# Patient Record
Sex: MALE | Race: WHITE | NOT HISPANIC OR LATINO | Employment: OTHER | URBAN - METROPOLITAN AREA
[De-identification: names, ages, dates, MRNs, and addresses within clinical notes are randomized per-mention and may not be internally consistent; named-entity substitution may affect disease eponyms.]

---

## 2018-02-20 ENCOUNTER — OFFICE VISIT CONVERTED (OUTPATIENT)
Dept: SURGERY | Facility: CLINIC | Age: 53
End: 2018-02-20
Attending: UROLOGY

## 2018-03-06 ENCOUNTER — OFFICE VISIT CONVERTED (OUTPATIENT)
Dept: SURGERY | Facility: CLINIC | Age: 53
End: 2018-03-06
Attending: UROLOGY

## 2018-06-15 ENCOUNTER — OFFICE VISIT CONVERTED (OUTPATIENT)
Dept: FAMILY MEDICINE CLINIC | Facility: CLINIC | Age: 53
End: 2018-06-15
Attending: NURSE PRACTITIONER

## 2018-08-13 ENCOUNTER — OFFICE VISIT CONVERTED (OUTPATIENT)
Dept: FAMILY MEDICINE CLINIC | Facility: CLINIC | Age: 53
End: 2018-08-13
Attending: NURSE PRACTITIONER

## 2018-11-28 ENCOUNTER — OFFICE VISIT CONVERTED (OUTPATIENT)
Dept: PULMONOLOGY | Facility: CLINIC | Age: 53
End: 2018-11-28
Attending: INTERNAL MEDICINE

## 2019-02-15 ENCOUNTER — OFFICE VISIT CONVERTED (OUTPATIENT)
Dept: PULMONOLOGY | Facility: CLINIC | Age: 54
End: 2019-02-15
Attending: INTERNAL MEDICINE

## 2019-05-10 ENCOUNTER — OFFICE VISIT CONVERTED (OUTPATIENT)
Dept: PULMONOLOGY | Facility: CLINIC | Age: 54
End: 2019-05-10
Attending: INTERNAL MEDICINE

## 2019-07-19 ENCOUNTER — HOSPITAL ENCOUNTER (OUTPATIENT)
Dept: GENERAL RADIOLOGY | Facility: HOSPITAL | Age: 54
Discharge: HOME | End: 2019-07-19
Attending: INTERNAL MEDICINE

## 2019-10-03 ENCOUNTER — OFFICE VISIT CONVERTED (OUTPATIENT)
Dept: PULMONOLOGY | Facility: CLINIC | Age: 54
End: 2019-10-03
Attending: INTERNAL MEDICINE

## 2019-10-09 ENCOUNTER — HOSPITAL ENCOUNTER (OUTPATIENT)
Dept: CARDIOLOGY | Facility: HOSPITAL | Age: 54
Discharge: HOME | End: 2019-10-09
Attending: INTERNAL MEDICINE

## 2020-04-16 ENCOUNTER — TELEPHONE CONVERTED (OUTPATIENT)
Dept: FAMILY MEDICINE CLINIC | Facility: CLINIC | Age: 55
End: 2020-04-16
Attending: FAMILY MEDICINE

## 2020-04-20 ENCOUNTER — HOSPITAL ENCOUNTER (OUTPATIENT)
Dept: FAMILY MEDICINE CLINIC | Facility: CLINIC | Age: 55
Discharge: HOME | End: 2020-04-20
Attending: FAMILY MEDICINE

## 2020-04-21 LAB
ALBUMIN SERPL-MCNC: 4.4 G/DL (ref 3.5–5)
ALBUMIN/GLOB SERPL: 1.5 {RATIO} (ref 1.4–2.6)
ALP SERPL-CCNC: 97 U/L (ref 56–119)
ALT SERPL-CCNC: 15 U/L (ref 10–40)
ANION GAP SERPL CALC-SCNC: 18 MMOL/L (ref 8–19)
APPEARANCE UR: ABNORMAL
AST SERPL-CCNC: 18 U/L (ref 15–50)
BASOPHILS # BLD AUTO: 0.03 10*3/UL (ref 0–0.2)
BASOPHILS NFR BLD AUTO: 0.2 % (ref 0–3)
BILIRUB SERPL-MCNC: 0.58 MG/DL (ref 0.2–1.3)
BILIRUB UR QL: NEGATIVE
BUN SERPL-MCNC: 12 MG/DL (ref 5–25)
BUN/CREAT SERPL: 13 {RATIO} (ref 6–20)
CALCIUM SERPL-MCNC: 9.3 MG/DL (ref 8.7–10.4)
CHLORIDE SERPL-SCNC: 96 MMOL/L (ref 99–111)
COLOR UR: YELLOW
CONV ABS IMM GRAN: 0.05 10*3/UL (ref 0–0.2)
CONV BACTERIA: NEGATIVE
CONV CO2: 28 MMOL/L (ref 22–32)
CONV COLLECTION SOURCE (UA): ABNORMAL
CONV IMMATURE GRAN: 0.4 % (ref 0–1.8)
CONV TOTAL PROTEIN: 7.3 G/DL (ref 6.3–8.2)
CONV UROBILINOGEN IN URINE BY AUTOMATED TEST STRIP: 1 {EHRLICHU}/DL (ref 0.1–1)
CREAT UR-MCNC: 0.93 MG/DL (ref 0.7–1.2)
DEPRECATED RDW RBC AUTO: 53.7 FL (ref 35.1–43.9)
EOSINOPHIL # BLD AUTO: 0.01 10*3/UL (ref 0–0.7)
EOSINOPHIL # BLD AUTO: 0.1 % (ref 0–7)
ERYTHROCYTE [DISTWIDTH] IN BLOOD BY AUTOMATED COUNT: 14.9 % (ref 11.6–14.4)
GFR SERPLBLD BASED ON 1.73 SQ M-ARVRAT: >60 ML/MIN/{1.73_M2}
GLOBULIN UR ELPH-MCNC: 2.9 G/DL (ref 2–3.5)
GLUCOSE SERPL-MCNC: 146 MG/DL (ref 70–99)
GLUCOSE UR QL: NEGATIVE MG/DL
HCT VFR BLD AUTO: 45.1 % (ref 42–52)
HGB BLD-MCNC: 14.2 G/DL (ref 14–18)
HGB UR QL STRIP: NEGATIVE
KETONES UR QL STRIP: NEGATIVE MG/DL
LEUKOCYTE ESTERASE UR QL STRIP: NEGATIVE
LYMPHOCYTES # BLD AUTO: 0.66 10*3/UL (ref 1–5)
LYMPHOCYTES NFR BLD AUTO: 5.4 % (ref 20–45)
MCH RBC QN AUTO: 30.9 PG (ref 27–31)
MCHC RBC AUTO-ENTMCNC: 31.5 G/DL (ref 33–37)
MCV RBC AUTO: 98 FL (ref 80–96)
MONOCYTES # BLD AUTO: 0.12 10*3/UL (ref 0.2–1.2)
MONOCYTES NFR BLD AUTO: 1 % (ref 3–10)
NEUTROPHILS # BLD AUTO: 11.33 10*3/UL (ref 2–8)
NEUTROPHILS NFR BLD AUTO: 92.9 % (ref 30–85)
NITRITE UR QL STRIP: NEGATIVE
NRBC CBCN: 0 % (ref 0–0.7)
OSMOLALITY SERPL CALC.SUM OF ELEC: 286 MOSM/KG (ref 273–304)
PH UR STRIP.AUTO: >=9 [PH] (ref 5–8)
PLATELET # BLD AUTO: 378 10*3/UL (ref 130–400)
PMV BLD AUTO: 11.6 FL (ref 9.4–12.4)
POTASSIUM SERPL-SCNC: 4.7 MMOL/L (ref 3.5–5.3)
PROT UR QL: NEGATIVE MG/DL
PSA SERPL-MCNC: 0.54 NG/ML (ref 0–4)
RBC # BLD AUTO: 4.6 10*6/UL (ref 4.7–6.1)
RBC #/AREA URNS HPF: ABNORMAL /[HPF]
SODIUM SERPL-SCNC: 137 MMOL/L (ref 135–147)
SP GR UR: 1.02 (ref 1–1.03)
T4 FREE SERPL-MCNC: 1.5 NG/DL (ref 0.9–1.8)
TSH SERPL-ACNC: 0.82 M[IU]/L (ref 0.27–4.2)
WBC # BLD AUTO: 12.2 10*3/UL (ref 4.8–10.8)
WBC #/AREA URNS HPF: ABNORMAL /[HPF]

## 2020-04-23 LAB — BACTERIA UR CULT: NORMAL

## 2020-04-30 ENCOUNTER — HOSPITAL ENCOUNTER (OUTPATIENT)
Dept: FAMILY MEDICINE CLINIC | Facility: CLINIC | Age: 55
Discharge: HOME | End: 2020-04-30
Attending: FAMILY MEDICINE

## 2020-04-30 LAB
BASOPHILS # BLD AUTO: 0.02 10*3/UL (ref 0–0.2)
BASOPHILS NFR BLD AUTO: 0.3 % (ref 0–3)
CONV ABS IMM GRAN: 0.03 10*3/UL (ref 0–0.2)
CONV IMMATURE GRAN: 0.4 % (ref 0–1.8)
DEPRECATED RDW RBC AUTO: 52.7 FL (ref 35.1–43.9)
EOSINOPHIL # BLD AUTO: 0.07 10*3/UL (ref 0–0.7)
EOSINOPHIL # BLD AUTO: 1 % (ref 0–7)
ERYTHROCYTE [DISTWIDTH] IN BLOOD BY AUTOMATED COUNT: 14.7 % (ref 11.6–14.4)
EST. AVERAGE GLUCOSE BLD GHB EST-MCNC: 111 MG/DL
HBA1C MFR BLD: 5.5 % (ref 3.5–5.7)
HCT VFR BLD AUTO: 44.4 % (ref 42–52)
HGB BLD-MCNC: 14.1 G/DL (ref 14–18)
LYMPHOCYTES # BLD AUTO: 1.72 10*3/UL (ref 1–5)
LYMPHOCYTES NFR BLD AUTO: 25.1 % (ref 20–45)
MCH RBC QN AUTO: 30.7 PG (ref 27–31)
MCHC RBC AUTO-ENTMCNC: 31.8 G/DL (ref 33–37)
MCV RBC AUTO: 96.5 FL (ref 80–96)
MONOCYTES # BLD AUTO: 0.38 10*3/UL (ref 0.2–1.2)
MONOCYTES NFR BLD AUTO: 5.5 % (ref 3–10)
NEUTROPHILS # BLD AUTO: 4.63 10*3/UL (ref 2–8)
NEUTROPHILS NFR BLD AUTO: 67.7 % (ref 30–85)
NRBC CBCN: 0 % (ref 0–0.7)
PLATELET # BLD AUTO: 337 10*3/UL (ref 130–400)
PMV BLD AUTO: 9.9 FL (ref 9.4–12.4)
RBC # BLD AUTO: 4.6 10*6/UL (ref 4.7–6.1)
WBC # BLD AUTO: 6.85 10*3/UL (ref 4.8–10.8)

## 2020-07-15 ENCOUNTER — HOSPITAL ENCOUNTER (OUTPATIENT)
Dept: FAMILY MEDICINE CLINIC | Facility: CLINIC | Age: 55
Discharge: HOME | End: 2020-07-15
Attending: FAMILY MEDICINE

## 2020-07-15 LAB
CHOLEST SERPL-MCNC: 193 MG/DL (ref 107–200)
CHOLEST/HDLC SERPL: 4.2 {RATIO} (ref 3–6)
HDLC SERPL-MCNC: 46 MG/DL (ref 40–60)
LDLC SERPL CALC-MCNC: 135 MG/DL (ref 70–100)
TRIGL SERPL-MCNC: 61 MG/DL (ref 40–150)
VLDLC SERPL-MCNC: 12 MG/DL (ref 5–37)

## 2020-11-04 ENCOUNTER — HOSPITAL ENCOUNTER (OUTPATIENT)
Dept: FAMILY MEDICINE CLINIC | Facility: CLINIC | Age: 55
Discharge: HOME | End: 2020-11-04
Attending: FAMILY MEDICINE

## 2020-11-04 ENCOUNTER — HOSPITAL ENCOUNTER (OUTPATIENT)
Dept: OTHER | Facility: HOSPITAL | Age: 55
Discharge: HOME | End: 2020-11-04
Attending: FAMILY MEDICINE

## 2020-11-04 ENCOUNTER — OFFICE VISIT CONVERTED (OUTPATIENT)
Dept: FAMILY MEDICINE CLINIC | Facility: CLINIC | Age: 55
End: 2020-11-04
Attending: FAMILY MEDICINE

## 2020-11-04 LAB
ALBUMIN SERPL-MCNC: 4.5 G/DL (ref 3.5–5)
ALBUMIN/GLOB SERPL: 1.5 {RATIO} (ref 1.4–2.6)
ALP SERPL-CCNC: 99 U/L (ref 56–119)
ALT SERPL-CCNC: 21 U/L (ref 10–40)
ANION GAP SERPL CALC-SCNC: 16 MMOL/L (ref 8–19)
AST SERPL-CCNC: 28 U/L (ref 15–50)
BASOPHILS # BLD AUTO: 0.05 10*3/UL (ref 0–0.2)
BASOPHILS NFR BLD AUTO: 0.7 % (ref 0–3)
BILIRUB SERPL-MCNC: 0.75 MG/DL (ref 0.2–1.3)
BUN SERPL-MCNC: 14 MG/DL (ref 5–25)
BUN/CREAT SERPL: 13 {RATIO} (ref 6–20)
CALCIUM SERPL-MCNC: 9.2 MG/DL (ref 8.7–10.4)
CHLORIDE SERPL-SCNC: 97 MMOL/L (ref 99–111)
CHOLEST SERPL-MCNC: 209 MG/DL (ref 107–200)
CHOLEST/HDLC SERPL: 3.7 {RATIO} (ref 3–6)
CONV ABS IMM GRAN: 0.03 10*3/UL (ref 0–0.2)
CONV CO2: 30 MMOL/L (ref 22–32)
CONV IMMATURE GRAN: 0.4 % (ref 0–1.8)
CONV TOTAL PROTEIN: 7.6 G/DL (ref 6.3–8.2)
CREAT UR-MCNC: 1.08 MG/DL (ref 0.7–1.2)
DEPRECATED RDW RBC AUTO: 46.6 FL (ref 35.1–43.9)
EOSINOPHIL # BLD AUTO: 0.09 10*3/UL (ref 0–0.7)
EOSINOPHIL # BLD AUTO: 1.2 % (ref 0–7)
ERYTHROCYTE [DISTWIDTH] IN BLOOD BY AUTOMATED COUNT: 13.4 % (ref 11.6–14.4)
GFR SERPLBLD BASED ON 1.73 SQ M-ARVRAT: >60 ML/MIN/{1.73_M2}
GLOBULIN UR ELPH-MCNC: 3.1 G/DL (ref 2–3.5)
GLUCOSE SERPL-MCNC: 95 MG/DL (ref 70–99)
HCT VFR BLD AUTO: 45.9 % (ref 42–52)
HDLC SERPL-MCNC: 57 MG/DL (ref 40–60)
HGB BLD-MCNC: 15 G/DL (ref 14–18)
LDLC SERPL CALC-MCNC: 142 MG/DL (ref 70–100)
LYMPHOCYTES # BLD AUTO: 1.87 10*3/UL (ref 1–5)
LYMPHOCYTES NFR BLD AUTO: 24.6 % (ref 20–45)
MCH RBC QN AUTO: 30.9 PG (ref 27–31)
MCHC RBC AUTO-ENTMCNC: 32.7 G/DL (ref 33–37)
MCV RBC AUTO: 94.6 FL (ref 80–96)
MONOCYTES # BLD AUTO: 0.37 10*3/UL (ref 0.2–1.2)
MONOCYTES NFR BLD AUTO: 4.9 % (ref 3–10)
NEUTROPHILS # BLD AUTO: 5.2 10*3/UL (ref 2–8)
NEUTROPHILS NFR BLD AUTO: 68.2 % (ref 30–85)
NRBC CBCN: 0 % (ref 0–0.7)
OSMOLALITY SERPL CALC.SUM OF ELEC: 288 MOSM/KG (ref 273–304)
PLATELET # BLD AUTO: 306 10*3/UL (ref 130–400)
PMV BLD AUTO: 11 FL (ref 9.4–12.4)
POTASSIUM SERPL-SCNC: 4.1 MMOL/L (ref 3.5–5.3)
RBC # BLD AUTO: 4.85 10*6/UL (ref 4.7–6.1)
SODIUM SERPL-SCNC: 139 MMOL/L (ref 135–147)
TRIGL SERPL-MCNC: 50 MG/DL (ref 40–150)
VLDLC SERPL-MCNC: 10 MG/DL (ref 5–37)
WBC # BLD AUTO: 7.61 10*3/UL (ref 4.8–10.8)

## 2020-11-06 LAB — BACTERIA UR CULT: NORMAL

## 2020-11-13 ENCOUNTER — OFFICE VISIT CONVERTED (OUTPATIENT)
Dept: UROLOGY | Facility: CLINIC | Age: 55
End: 2020-11-13
Attending: NURSE PRACTITIONER

## 2020-11-13 ENCOUNTER — CONVERSION ENCOUNTER (OUTPATIENT)
Dept: SURGERY | Facility: CLINIC | Age: 55
End: 2020-11-13

## 2020-12-15 ENCOUNTER — OFFICE VISIT CONVERTED (OUTPATIENT)
Dept: UROLOGY | Facility: CLINIC | Age: 55
End: 2020-12-15
Attending: NURSE PRACTITIONER

## 2020-12-15 ENCOUNTER — CONVERSION ENCOUNTER (OUTPATIENT)
Dept: SURGERY | Facility: CLINIC | Age: 55
End: 2020-12-15

## 2020-12-30 ENCOUNTER — HOSPITAL ENCOUNTER (OUTPATIENT)
Dept: FAMILY MEDICINE CLINIC | Facility: CLINIC | Age: 55
Discharge: HOME | End: 2020-12-30
Attending: FAMILY MEDICINE

## 2020-12-30 ENCOUNTER — OFFICE VISIT CONVERTED (OUTPATIENT)
Dept: FAMILY MEDICINE CLINIC | Facility: CLINIC | Age: 55
End: 2020-12-30
Attending: FAMILY MEDICINE

## 2021-02-11 ENCOUNTER — TELEMEDICINE CONVERTED (OUTPATIENT)
Dept: FAMILY MEDICINE CLINIC | Facility: CLINIC | Age: 56
End: 2021-02-11
Attending: FAMILY MEDICINE

## 2021-03-04 ENCOUNTER — HOSPITAL ENCOUNTER (OUTPATIENT)
Dept: FAMILY MEDICINE CLINIC | Facility: CLINIC | Age: 56
Discharge: HOME | End: 2021-03-04
Attending: FAMILY MEDICINE

## 2021-03-04 ENCOUNTER — HOSPITAL ENCOUNTER (OUTPATIENT)
Dept: CARDIOLOGY | Facility: HOSPITAL | Age: 56
Discharge: HOME | End: 2021-03-04
Attending: FAMILY MEDICINE

## 2021-03-04 ENCOUNTER — OFFICE VISIT CONVERTED (OUTPATIENT)
Dept: FAMILY MEDICINE CLINIC | Facility: CLINIC | Age: 56
End: 2021-03-04
Attending: FAMILY MEDICINE

## 2021-03-04 LAB
ALBUMIN SERPL-MCNC: 4.2 G/DL (ref 3.5–5)
ALBUMIN/GLOB SERPL: 1.4 {RATIO} (ref 1.4–2.6)
ALP SERPL-CCNC: 84 U/L (ref 56–119)
ALT SERPL-CCNC: 16 U/L (ref 10–40)
ANION GAP SERPL CALC-SCNC: 14 MMOL/L (ref 8–19)
AST SERPL-CCNC: 21 U/L (ref 15–50)
BASOPHILS # BLD AUTO: 0.04 10*3/UL (ref 0–0.2)
BASOPHILS NFR BLD AUTO: 0.6 % (ref 0–3)
BILIRUB SERPL-MCNC: 0.9 MG/DL (ref 0.2–1.3)
BNP SERPL-MCNC: 19 PG/ML (ref 0–900)
BUN SERPL-MCNC: 12 MG/DL (ref 5–25)
BUN/CREAT SERPL: 13 {RATIO} (ref 6–20)
CALCIUM SERPL-MCNC: 9.9 MG/DL (ref 8.7–10.4)
CHLORIDE SERPL-SCNC: 96 MMOL/L (ref 99–111)
CONV ABS IMM GRAN: 0.02 10*3/UL (ref 0–0.2)
CONV CO2: 33 MMOL/L (ref 22–32)
CONV IMMATURE GRAN: 0.3 % (ref 0–1.8)
CONV TOTAL PROTEIN: 7.2 G/DL (ref 6.3–8.2)
CREAT UR-MCNC: 0.95 MG/DL (ref 0.7–1.2)
DEPRECATED RDW RBC AUTO: 48.3 FL (ref 35.1–43.9)
EOSINOPHIL # BLD AUTO: 0.1 10*3/UL (ref 0–0.7)
EOSINOPHIL # BLD AUTO: 1.5 % (ref 0–7)
ERYTHROCYTE [DISTWIDTH] IN BLOOD BY AUTOMATED COUNT: 13.5 % (ref 11.6–14.4)
FOLATE SERPL-MCNC: >20 NG/ML (ref 4.8–20)
GFR SERPLBLD BASED ON 1.73 SQ M-ARVRAT: >60 ML/MIN/{1.73_M2}
GLOBULIN UR ELPH-MCNC: 3 G/DL (ref 2–3.5)
GLUCOSE SERPL-MCNC: 91 MG/DL (ref 70–99)
HCT VFR BLD AUTO: 44.2 % (ref 42–52)
HGB BLD-MCNC: 14.2 G/DL (ref 14–18)
LYMPHOCYTES # BLD AUTO: 1.49 10*3/UL (ref 1–5)
LYMPHOCYTES NFR BLD AUTO: 22.1 % (ref 20–45)
MCH RBC QN AUTO: 31 PG (ref 27–31)
MCHC RBC AUTO-ENTMCNC: 32.1 G/DL (ref 33–37)
MCV RBC AUTO: 96.5 FL (ref 80–96)
MONOCYTES # BLD AUTO: 0.42 10*3/UL (ref 0.2–1.2)
MONOCYTES NFR BLD AUTO: 6.2 % (ref 3–10)
NEUTROPHILS # BLD AUTO: 4.66 10*3/UL (ref 2–8)
NEUTROPHILS NFR BLD AUTO: 69.3 % (ref 30–85)
NRBC CBCN: 0 % (ref 0–0.7)
OSMOLALITY SERPL CALC.SUM OF ELEC: 287 MOSM/KG (ref 273–304)
PLATELET # BLD AUTO: 279 10*3/UL (ref 130–400)
PMV BLD AUTO: 10.4 FL (ref 9.4–12.4)
POTASSIUM SERPL-SCNC: 3.7 MMOL/L (ref 3.5–5.3)
RBC # BLD AUTO: 4.58 10*6/UL (ref 4.7–6.1)
SODIUM SERPL-SCNC: 139 MMOL/L (ref 135–147)
T4 FREE SERPL-MCNC: 1.4 NG/DL (ref 0.9–1.8)
TROPONIN T SERPL-MCNC: <0.01 NG/ML (ref 0–0.1)
TSH SERPL-ACNC: 1.81 M[IU]/L (ref 0.27–4.2)
VIT B12 SERPL-MCNC: >2000 PG/ML (ref 211–911)
WBC # BLD AUTO: 6.73 10*3/UL (ref 4.8–10.8)

## 2021-03-10 ENCOUNTER — HOSPITAL ENCOUNTER (OUTPATIENT)
Dept: CARDIOLOGY | Facility: HOSPITAL | Age: 56
Discharge: HOME | End: 2021-03-10
Attending: FAMILY MEDICINE

## 2021-05-09 VITALS
OXYGEN SATURATION: 97 % | TEMPERATURE: 98 F | HEIGHT: 64 IN | HEART RATE: 89 BPM | DIASTOLIC BLOOD PRESSURE: 80 MMHG | SYSTOLIC BLOOD PRESSURE: 122 MMHG | BODY MASS INDEX: 30.81 KG/M2 | WEIGHT: 180.5 LBS

## 2021-05-09 VITALS
HEART RATE: 88 BPM | SYSTOLIC BLOOD PRESSURE: 122 MMHG | TEMPERATURE: 97.4 F | BODY MASS INDEX: 27.47 KG/M2 | DIASTOLIC BLOOD PRESSURE: 80 MMHG | OXYGEN SATURATION: 98 % | HEIGHT: 67 IN | WEIGHT: 175 LBS

## 2021-05-09 VITALS
HEIGHT: 64 IN | BODY MASS INDEX: 32.16 KG/M2 | OXYGEN SATURATION: 95 % | DIASTOLIC BLOOD PRESSURE: 80 MMHG | WEIGHT: 188.37 LBS | SYSTOLIC BLOOD PRESSURE: 124 MMHG | HEART RATE: 82 BPM | TEMPERATURE: 97.5 F

## 2021-05-09 VITALS
SYSTOLIC BLOOD PRESSURE: 164 MMHG | WEIGHT: 180 LBS | HEART RATE: 78 BPM | OXYGEN SATURATION: 98 % | DIASTOLIC BLOOD PRESSURE: 90 MMHG | TEMPERATURE: 98.9 F

## 2021-05-09 VITALS
HEART RATE: 89 BPM | BODY MASS INDEX: 27.8 KG/M2 | HEIGHT: 67 IN | DIASTOLIC BLOOD PRESSURE: 80 MMHG | TEMPERATURE: 97.6 F | WEIGHT: 177.12 LBS | OXYGEN SATURATION: 96 % | SYSTOLIC BLOOD PRESSURE: 126 MMHG

## 2021-05-11 ENCOUNTER — OFFICE VISIT CONVERTED (OUTPATIENT)
Dept: FAMILY MEDICINE CLINIC | Facility: CLINIC | Age: 56
End: 2021-05-11
Attending: FAMILY MEDICINE

## 2021-05-13 NOTE — PROGRESS NOTES
Progress Note      Patient Name: Avelino Lowry   Patient ID: [id number]   Sex: Male   YOB: 1965    Primary Care Provider: Desmond Lloyd MD   Referring Provider: Desmond Lloyd MD    Visit Date: December 15, 2020    Provider: DAVE Charlton   Location: [facility]   Location Address: [address] [place] [address]  [id number]   Location Phone: [phone]          Chief Complaint  · pt here for urological concerns      History Of Present Illness  The patient is a 55 year old /White male , presents for f/u on testicular pain. He was last treated with a long-term dose of antibiotics as well as a short burst of prednisone.        The patient reports some erectile dysfunction he has issues with premature ejaculation.     He reports that his testicular pain has now resolved and has not had any further issues with this.       Previous:  ultrasound from 11/4/2020 revealed no visible mass.  Normal echotexture, size, and flow.  Epididymis normal.  No visible mass or cyst.  No hydrocele, varicocele or peristalsing bowel.  No scrotal wall edema.    Patient was previously seen by Dr. Ellen Khoury and treated with 2 weeks of ciprofloxacin.  The patient had no relief of his pain at that point in time.  However the pain slowly ceased.  He states that the pain approximately began to get worse about 3 months ago once again.       Past Medical History  COPD         Past Surgical History  Colonoscopy         Medication List  albuterol sulfate 0.63 mg/3 mL inhalation solution for nebulization; albuterol sulfate 90 mcg/actuation inhalation HFA aerosol inhaler; calcium citrate 200 mg (950 mg) oral tablet; Flomax 0.4 mg oral capsule,extended release 24hr; ipratropium bromide 0.02 % inhalation solution; multivitamin oral tablet; Omega 3-6-9 1,200 mg oral capsule; potassium chloride 10 mEq oral capsule, extended release; Spiriva Respimat 1.25 mcg/actuation inhalation mist; Sudogest 60 mg  "oral tablet; Trelegy Ellipta 100-62.5-25 mcg inhalation blister with device; vitamin B12-folic acid 500-400 mcg oral tablet; vitamin E 400 unit oral capsule         Allergy List  No known history of drug allergy         Family Medical History  - No Family History of Colorectal Cancer; *No Known Family History         Social History  Alcohol; Tobacco (Former)         Review of Systems  · Constitutional  o Denies  o : fever, chills  · Cardiovascular  o Denies  o : chest pain, cardiac murmurs, irregular heart beats, dyspnea on exertion  · Respiratory  o Denies  o : shortness of breath, wheezing  · Gastrointestinal  o Denies  o : nausea, vomiting, change in abdominal girth, diarrhea, constipation, blood in stools  · Genitourinary  o Admits  o : scrotal pain  o Denies  o : urgency, frequency, dysuria, nocturia  · Integument  o Denies  o : rash, itching, new skin lesions  · Neurologic  o Denies  o : tingling or numbness, incoordination, seizures  · Endocrine  o Denies  o : polyuria, polydipsia, cold intolerance, heat intolerance, weight gain, weight loss  · Psychiatric  o Denies  o : anxiety, depression  · Heme-Lymph  o Denies  o : easy bleeding, easy bruising, lymph node enlargement or tenderness      Vitals  Date Time BP Position Site L\R Cuff Size HR RR TEMP (F) WT  HT  BMI kg/m2 BSA m2 O2 Sat FR L/min FiO2        12/15/2020 10:09 AM       14  180lbs 2oz 5'  6\" 29.07 1.95             Physical Examination  · Constitutional  o Appearance  o : Well nourished, well developed patient in no acute distress. Ambulating without difficulty.  · Head and Face  o Head  o :   § Inspection  § : atraumatic, normocephalic  o Face  o :   § Inspection  § : no facial lesions  · Eyes  o Sclerae  o : sclerae white  · Ears, Nose, Mouth and Throat  o Ears  o :   § External Ears  § : appearance within normal limits, no lesions present  o Nose  o :   § External Nose  § : appearance normal  · Neck  o Inspection/Palpation  o : normal " appearance, trachea midline  · Respiratory  o Respiratory Effort  o : Breathing is unlabored without accessory muscle use  o Inspection of Chest  o : normal appearance, no retractions  · Skin and Subcutaneous Tissue  o General Inspection  o : No rashes, lesions or areas of discoloration present. Skin turgor is normal.  o General Palpation  o : No abnormalities, masses or tenderness on palpation.  · Neurologic  o Mental Status Examination  o :   § Orientation  § : grossly oriented to person, place and time  § Speech/Language  § : communication ability within normal limits  o Gait and Station  o : normal gait, able to stand without difficulty  · Psychiatric  o Judgement and Insight  o : judgment and insight intact, judgement for everyday activities and social situations within normal limits, insight intact  o Mood and Affect  o : mood normal, affect appropriate          Results  · In-Office Procedures  o Lab procedure  § Automated dipstick urinalysis with microscopy (52765)   § Color Ur: Yellow   § Clarity Ur: Clear   § Glucose Ur Ql Strip: Negative   § Bilirub Ur Ql Strip: Negative   § Ketones Ur Ql Strip: Negative   § Sp Gr Ur Qn: 1.020   § Hgb Ur Ql Strip: Negative   § pH Ur-LsCnc: 7.0   § Prot Ur Ql Strip: Negative   § Urobilinogen Ur Strip-mCnc: 0.2   § Nitrite Ur Ql Strip: Negative   § WBC Est Ur Ql Strip: Negative   § RBC UrnS Qn HPF: 0   § WBC UrnS Qn HPF: 0   § Bacteria UrnS Qn HPF: 0   § Crystals UrnS Qn HPF: 0   § Epithelial Cells (non renal): 0 /HPF  § Epithelial Cells (renal): 0       Assessment  · Right testicular pain     608.89      Plan  · Medications  o Medications have been Reconciled  o Transition of Care or Provider Policy  · Instructions  o Discussion and Plan: Patient's pain has now resolved we will follow-up with him as needed for this issue. The patient is also with complaints of erectile dysfunction and premature ejaculation. Discussed the use of numbing agents as well as JOAN and penile ring  for these issues. Follow-up with patient in 12 months in regards to this or sooner if needed.  o Electronically Identified Patient Education Materials Provided Electronically            Electronically Signed by: DAVE Charlton -Author on December 15, 2020 10:47:47 AM

## 2021-05-13 NOTE — PROGRESS NOTES
Progress Note      Patient Name: Avelino Lowry   Patient ID: [id number]   Sex: Male   YOB: 1965    Primary Care Provider: Desmond Lloyd MD   Referring Provider: Desmond Lloyd MD    Visit Date: November 13, 2020    Provider: DAVE Charlton   Location: [facility] [place] [facility]   Location Address: [address] [place] [address]  [id number]   Location Phone: [phone]          Chief Complaint  · pt here for urological concerns      History Of Present Illness  The patient is a 55 year old /White male, who presents on referral from Desmond Lloyd MD, for an urological evaluation for pain involving the right testicle that started approximately 2 years ago.   He relates the pain began gradually with no known activity and the pain has slowly worsened. The patient denies an associated testicular mass or groin adenopathy.   He gives no previous history of an undescended testicle.   Recently, diagnostic studies include testicular scan.      Nuclear ultrasound from 11/4/2020 revealed no visible mass.  Normal echotexture, size, and flow.  Epididymis normal.  No visible mass or cyst.  No hydrocele, varicocele or peristalsing bowel.  No scrotal wall edema.    Patient was previously seen by Dr. Ellen Khoury and treated with 2 weeks of ciprofloxacin.  The patient had no relief of his pain at that point in time.  However the pain slowly ceased.  He states that the pain approximately began to get worse about 3 months ago once again.       Past Medical History  Disease Name Date Onset Notes   COPD --  --          Past Surgical History  Procedure Name Date Notes   Colonoscopy 2017 --          Medication List  Name Date Started Instructions   albuterol sulfate 0.63 mg/3 mL inhalation solution for nebulization  use in nebulizer as directed daily   albuterol sulfate 90 mcg/actuation inhalation HFA aerosol inhaler  inhale 1 puff (90 mcg) by inhalation route every 6 hours as needed   calcium  citrate 200 mg (950 mg) oral tablet  take 1 tablet by oral route daily   Flomax 0.4 mg oral capsule,extended release 24hr 03/06/2018 take 1 capsule (0.4 mg) by oral route once daily 1/2 hour following the same meal each day   ipratropium bromide 0.02 % inhalation solution  inhale 1.25 milliliters (250 mcg) via nebulizer by inhalation route 3 times per day   multivitamin oral tablet  take 1 tablet by oral route daily   Omega 3-6-9 1,200 mg oral capsule  take 1 capsule by oral route daily   potassium chloride 10 mEq oral capsule, extended release  take 1 capsule (10 meq) by oral route once daily with food   Spiriva Respimat 1.25 mcg/actuation inhalation mist  inhale 2 puffs (2.5 mcg) by inhalation route once daily   Sudogest 60 mg oral tablet  take 1 tablet (60 mg) by oral route every 6 hours as needed   Trelegy Ellipta 100-62.5-25 mcg inhalation blister with device  inhale 1 puff by inhalation route once daily at the same time each day   vitamin B12-folic acid 500-400 mcg oral tablet  take 2 tablets by oral route daily   vitamin E 400 unit oral capsule  take 1 capsule by oral route daily         Allergy List  Allergen Name Date Reaction Notes   No known history of drug allergy --  --  --        Allergies Reconciled  Family Medical History  Disease Name Relative/Age Notes   - No Family History of Colorectal Cancer  --    *No Known Family History  --          Social History  Finding Status Start/Stop Quantity Notes   Alcohol --  --/-- --  --    Tobacco Former --/-- --  stopped 2017         Review of Systems  · Constitutional  o Denies  o : fever, chills  · Cardiovascular  o Denies  o : chest pain, cardiac murmurs, irregular heart beats, dyspnea on exertion  · Respiratory  o Denies  o : shortness of breath, wheezing  · Gastrointestinal  o Denies  o : nausea, vomiting, change in abdominal girth, diarrhea, constipation, blood in stools  · Genitourinary  o Admits  o : scrotal pain  o Denies  o : urgency, frequency,  "dysuria, nocturia  · Integument  o Denies  o : rash, itching, new skin lesions  · Neurologic  o Denies  o : tingling or numbness, incoordination, seizures  · Endocrine  o Denies  o : polyuria, polydipsia, cold intolerance, heat intolerance, weight gain, weight loss  · Psychiatric  o Denies  o : anxiety, depression  · Heme-Lymph  o Denies  o : easy bleeding, easy bruising, lymph node enlargement or tenderness      Vitals  Date Time BP Position Site L\R Cuff Size HR RR TEMP (F) WT  HT  BMI kg/m2 BSA m2 O2 Sat FR L/min FiO2        11/13/2020 02:03 PM       14  180lbs 8oz 5'  6\" 29.13 1.95             Physical Examination  · Constitutional  o Appearance  o : Well nourished, well developed patient in no acute distress. Ambulating without difficulty.  · Head and Face  o Head  o :   § Inspection  § : atraumatic, normocephalic  o Face  o :   § Inspection  § : no facial lesions  · Eyes  o Sclerae  o : sclerae white  · Ears, Nose, Mouth and Throat  o Ears  o :   § External Ears  § : appearance within normal limits, no lesions present  o Nose  o :   § External Nose  § : appearance normal  · Neck  o Inspection/Palpation  o : normal appearance, trachea midline  · Respiratory  o Respiratory Effort  o : Breathing is unlabored without accessory muscle use  o Inspection of Chest  o : normal appearance, no retractions  · Skin and Subcutaneous Tissue  o General Inspection  o : No rashes, lesions or areas of discoloration present. Skin turgor is normal.  o General Palpation  o : No abnormalities, masses or tenderness on palpation.  · Neurologic  o Mental Status Examination  o :   § Orientation  § : grossly oriented to person, place and time  § Speech/Language  § : communication ability within normal limits  o Gait and Station  o : normal gait, able to stand without difficulty  · Psychiatric  o Judgement and Insight  o : judgment and insight intact, judgement for everyday activities and social situations within normal limits, insight " intact  o Mood and Affect  o : mood normal, affect appropriate              Assessment  · Right testicular pain     608.89      Plan  · Medications  o doxycycline hyclate 100 mg oral capsule   SIG: take 1 capsule (100 mg) by oral route 2 times per day for 28 days   DISP: (56) Capsule with 0 refills  Prescribed on 11/13/2020     o Florajen3 460 mg (7.5-6- 1.5 bill. cell) oral capsule   SIG: take 1 capsule by oral route 2 times a day for 10 days   DISP: (20) Capsule with 0 refills  Prescribed on 11/13/2020     o prednisone 50 mg oral tablet   SIG: take 1 tablet (50 mg) by oral route once daily for 5 days   DISP: (5) Tablet with 0 refills  Prescribed on 11/13/2020     o Medications have been Reconciled  o Transition of Care or Provider Policy  · Instructions  o Discussion and Plan: The patient has testicular pain without clinical evidence of pathology. I have recommended long term dose of antibiotics and short term burst of prednisone. I will see the patient back in the office for follow-up.  o Electronically Identified Patient Education Materials Provided Electronically            Electronically Signed by: DAVE Charlton -Author on November 13, 2020 02:48:29 PM

## 2021-05-14 VITALS — HEIGHT: 66 IN | WEIGHT: 180.5 LBS | RESPIRATION RATE: 14 BRPM | BODY MASS INDEX: 29.01 KG/M2

## 2021-05-14 VITALS — HEIGHT: 66 IN | BODY MASS INDEX: 28.95 KG/M2 | WEIGHT: 180.12 LBS | RESPIRATION RATE: 14 BRPM

## 2021-05-16 VITALS — WEIGHT: 170 LBS | RESPIRATION RATE: 15 BRPM | HEIGHT: 66 IN | BODY MASS INDEX: 27.32 KG/M2

## 2021-05-16 VITALS — RESPIRATION RATE: 16 BRPM | BODY MASS INDEX: 27.32 KG/M2 | HEIGHT: 66 IN | WEIGHT: 170 LBS

## 2021-05-28 VITALS
DIASTOLIC BLOOD PRESSURE: 73 MMHG | BODY MASS INDEX: 29.5 KG/M2 | WEIGHT: 188.19 LBS | BODY MASS INDEX: 30.57 KG/M2 | RESPIRATION RATE: 14 BRPM | BODY MASS INDEX: 30.24 KG/M2 | HEART RATE: 99 BPM | RESPIRATION RATE: 14 BRPM | SYSTOLIC BLOOD PRESSURE: 132 MMHG | HEIGHT: 66 IN | HEIGHT: 66 IN | HEART RATE: 78 BPM | OXYGEN SATURATION: 97 % | TEMPERATURE: 98.1 F | SYSTOLIC BLOOD PRESSURE: 132 MMHG | HEIGHT: 66 IN | TEMPERATURE: 98.2 F | RESPIRATION RATE: 12 BRPM | WEIGHT: 184.5 LBS | HEART RATE: 79 BPM | OXYGEN SATURATION: 94 % | TEMPERATURE: 98.4 F | HEIGHT: 66 IN | RESPIRATION RATE: 14 BRPM | SYSTOLIC BLOOD PRESSURE: 130 MMHG | DIASTOLIC BLOOD PRESSURE: 90 MMHG | TEMPERATURE: 98.2 F | WEIGHT: 190.19 LBS | OXYGEN SATURATION: 95 % | OXYGEN SATURATION: 96 % | DIASTOLIC BLOOD PRESSURE: 82 MMHG | SYSTOLIC BLOOD PRESSURE: 133 MMHG | HEART RATE: 70 BPM | DIASTOLIC BLOOD PRESSURE: 88 MMHG | BODY MASS INDEX: 29.65 KG/M2 | WEIGHT: 183.56 LBS

## 2021-05-28 NOTE — PROGRESS NOTES
Patient: DYLON ESCOBAR     Acct: [id number]     Report: #[id number]  UNIT #: [id number]     : 1965    Encounter Date:2018  PRIMARY CARE: SANDRA GREGORY  ***Signed***  --------------------------------------------------------------------------------------------------------------------  Chief Complaint      Encounter Date      2018            Primary Care Provider      SANDRA GREGORY            Referring Provider      SANDRA GREGORY            Patient Complaint      Patient is complaining of      copd            VITALS      Height 5 ft 6 in / 167.64 cm      Weight 183 lbs 9 oz / 83.257367 kg      BSA 1.93 m2      BMI 29.6 kg/m2      Temperature 98.1 F / 36.72 C - Oral      Pulse 70      Respirations 14      Blood Pressure 130/82 Sitting, Left Arm      Pulse Oximetry 97%, roomair      Initial Exhaled Nitrous Oxide      Date:  2018      Exhaled Nitrous Oxide Results:  18            HPI      The patient is a very pleasant 53 year old  male former cigarette     smoker who quit smoking two months ago with COPD here for evaluation.  He     previously has seen Dr. Bari Velez up in [place] for a number of years  and    would like to switch providers and see a pulmonologist closer to home.  We will     need records from Dr. Velez's office to get further history. The patient states     back in January he was really sick at [facility] and was in the     hospital for pneumonia for which he was discharged after 5-6 days.  Nothing     growing on sputum or flu cultures.  He also states he had a chest x-ray in July     in [place] showing infiltrates had cleared, but also showed chronic lung     markings and emphysema.  He states he had PFTs a number of years ago and has     never had a chest CT.  The patient's wife is concerned because they have trouble    affording inhalers and he still has significant issues breathing. He does feel     better  since quitting smoking, but still gets short of breath.  He gets short of    breath with walking about 8-900 feet on a flat plane, but cannot walk up one     flight of steps. His shortness of breath is moderate in severity, worse with     exertion and relieved with rest. He has a cough throughout the day that is     chronic and productive of copious amounts of thin and thick clear sputum.  He     denies any hemoptysis or chest pain. He also has significant wheezing on     exertion.  He denies any seasonal allergies, itchy-scratchy throat, watery eyes     or nasal congestion. He denies any heartburn or acid reflux.  Denies any nausea,    vomiting, fevers, chills, weight changes or changes in appetite.  His wife is     concerned about his breathing and has been on a number of inhalers including     Spiriva which did not help, bevespi which they could not afford.  Currently, he     is taking his daughter's Combivent four times a day which helps quite a bit.            He is able to perform ADLs without difficulty.  Denies any swollen glands or     lymph nodes of the head and neck.            I have personally reviewed the review of systems, past family, social, surgical     and medical histories and I agree with the findings.            ROS      Constitutional:  Denies: Fatigue, Fever, Weight gain, Weight loss, Chills,     Insomnia, Other      Respiratory/Breathing:  Complains of: Shortness of air, Wheezing, Cough; Denies:    Hemoptysis, Pleuritic pain, Other      Endocrine:  Denies: Polydipsia, Polyuria, Heat/cold intolerance, Diabetes, Other      Eyes:  Denies: Blurred vision, Vision Changes, Other      Ears, nose, mouth, throat:  Denies: Congestion, Dysphagia, Hearing Changes, Nose    Bleeding, Nasal Discharge, Throat pain, Tinnitus, Other      Cardiovascular:  Denies: Chest Pain, Exertional dyspnea, Peripheral Edema,     Palpitations, Syncope, Wake up Gasping for air, Orthopnea, Tachycardia, Other       Gastrointestinal:  Denies: Abdominal pain/cramping, Bloody stools, Constipation,    Diarrhea, Melena, Nausea, Vomiting, Other      Genitourinary:  Denies: Dysuria, Urinary frequency, Incontinence, Hematuria,     Urgency, Other      Musculoskeletal:  Denies: Joint Pain, Joint Stiffness, Joint Swelling, Myalgias,    Other      Hematologic/lymphatic:  DENIES: Lymphadenopathy, Bruising, Bleeding tendencies,     Other      Neurologic:  Denies: Headache, Numbness, Weakness, Seizures, Other      Psychiatric:  Denies: Anxiety, Appropriate Effect, Depression, Other      Sleep:  No: Excessive daytime sleep, Morning Headache?, Snoring, Insomnia?, Stop    breathing at sleep?, Other      Integumentary:  Denies: Rash, Dry skin, Skin Warm to Touch, Other            FAMILY/SOCIAL/MEDICAL HX      Diabetes - Family Hx:  Mother      Is Father Still Living?:  Yes      Is Mother Still Living?:  No       Family History:  Yes      Social History:  No Tobacco Use, No Alcohol Use, No Recreational Drug use      Smoking status:  Former smoker (1.5 ppd x 40 y quit 2018)      Medical History:  Yes: Chronic Bronchitis/COPD (INHALER), Shortness Of Breath;     No: Arthritis, Asthma, Blood Disease, Chemotherapy/Cancer, Congestive Heart     Failu, Deafness or Ringing Ears, Diabetes, Seizures, Heart Attack,     Hemorrhoids/Rectal Prob, High Blood Pressure, Miscellaneous Medical/oth      Psychiatric History      none            PREVENTION      Hx Influenza Vaccination:  Yes (2017)      Date Influenza Vaccine Given:  Nov 1, 2018      2 or More Falls Past Year?:  No      Fall Past Year with Injury?:  No      Hx Pneumococcal Vaccination:  Yes (3-4 YEARS AGO )      Encouraged to follow-up with:  PCP regarding preventative exams.      Chart initiated by      farzaneh betts            ALLERGIES/MEDICATIONS      Allergies:        Coded Allergies:             NO KNOWN ALLERGIES (Unverified , 11/28/18)      Medications    Last Reconciled on 11/28/18 10:45  by AMADOR CAGE MD      Budesonide/Formoterol Fumarate (Symbicort 160/4.5 Mcg) 10.2 Gm Inh      2 PUFF INH BID, #1 INH 5 Refills         Prov: MARIANNECECELIAAMADOR         11/28/18       Varenicline Tartrate (Chantix) 1 Mg Tablet      1 MG PO BID for 30 Days, #60 TAB         Reported         11/28/18       Tamsulosin HCL (Tamsulosin) 0.4 Mg Capsule      0.4 MG PO HS, #30 CAP 0 Refills         Reported         11/28/18      Current Medications      Current Medications Reviewed 11/28/18            EXAM      Vital Signs Reviewed.      General:  WDWN, Alert, NAD.      HEENT: PERRL, EOMI.  OP, nares clear, no sinus tenderness.      Neck: Supple, no JVD, no thyromegaly.      Lymph: No axillary, cervical, supraclavicular lymphadenopathy noted bilaterally.      Chest: Good aeration, coarse rhonchi with expiration at bilateral bases,     tympanic to percussion bilaterally, no work of breathing noted.      CV: RRR, no MGR, pulses 2+, equal.        Abd: Soft, NT, ND, +BS, no HSM.      EXT: No clubbing, no cyanosis, no edema, no joint tenderness.        Neuro:  A  Skin: No rashes or lesions.      Vitals      Vitals:             Height 5 ft 6 in / 167.64 cm           Weight 183 lbs 9 oz / 83.330014 kg           BSA 1.93 m2           BMI 29.6 kg/m2           Temperature 98.1 F / 36.72 C - Oral           Pulse 70           Respirations 14           Blood Pressure 130/82 Sitting, Left Arm           Pulse Oximetry 97%, roomair            REVIEW      Results Reviewed      PCCS Results Reviewed?:  Yes Prev Lab Results, Yes Prev Radiology Results, Yes     [facility]      Lab Results      I personally reviewed office notes from referring provider. I personally     reviewed chest x-ray from 01/2018.  I reviewed patient's discharge summary and     H  and no evidence of chronic hypercapnic respiratory failure.  The patient was     hypoxemic on admission at that time and had ABG on 2 liters that was     7.37/51/67/29.       Radiographic Results               [facility]                PACS RADIOLOGY REPORT            Patient: DYLON ESCOBAR   Acct: #[id number]   Report: #[id number]            UNIT #: [id number]    DOS: 18 0931      INSURANCE:[employer] - PPO   ORDER #:[id number]      LOCATION:ER     : 1965            PROVIDERS      ADMITTING:     ATTENDING:       FAMILY:     ORDERING:  RANDY LADD         OTHER:    DICTATING:  Baltazar Poon MD            REQ #:[id number]   EXAM:CXR1 - CHEST 1 View AP PA      REASON FOR EXAM:  Shortness of Breath      REASON FOR VISIT:  SOA            *******Signed******         PROCEDURE:   CHEST AP/PA SINGLE VIEW             COMPARISON:   None.             INDICATIONS:   Shortness of Breath             FINDINGS:         Patchy ill-defined infiltrates are seen throughout the lungs bilaterally. There     may be mild diffuse       interstitial prominence as well. No pleural effusions are observed. The cardiac     silhouette is       within normal limits. The mediastinum is unremarkable. No definitive acute     osseous abnormalities       are seen on this single view.             CONCLUSION:         1. Patchy airspace infiltrates with underlying interstitial prominence. These     findings may be       related to developing multifocal pneumonia or atypical/viral infection. Findings    secondary to       pulmonary edema could also have this appearance.              BALTAZAR POON MD             Electronically Signed and Approved By: BALTAZAR POON MD on 2018 at 10:00                           Until signed, this is an unconfirmed preliminary report that may contain      errors and is subject to change.                                              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            GALO (dyspnea on exertion) - R06.09            Cough - R05            Wheeze - R06.2            Notes      New Medications      * Tamsulosin HCL (Tamsulosin) 0.4 MG CAPSULE: 0.4 MG PO HS #30      * Varenicline Tartrate (Chantix) 1 MG TABLET: 1 MG PO BID 30 Days #60         Instructions: FOR SMOKING CESSATION      * Budesonide/Formoterol Fumarate (Symbicort 160/4.5 Mcg) 10.2 GM INH: 2 PUFF INH      BID #1         Dx: COPD (chronic obstructive pulmonary disease) - J44.9      Discontinued Medications      * Oseltamivir Phosphate (Tamiflu*) 75 MG CAPSULE: 75 MG PO BID #5      * AMOXICILLIN/CLAVULANATE K (Augmentin 875/125 Mg) 1 EACH TABLET: 875 MG PO BID       5 Days #10      * Albuterol/Ipratropium (Duoneb) 3 ML AMPUL.NEB: 3 ML INH Q4H PRN SHORTNESS OF       BREATH #120         Instructions: DIAGNOSIS CODE REQUIRED PRIOR TO PRESCRIBING.         Dx: COPD (chronic obstructive pulmonary disease) - J44.9      * Saccharomyces Boulardii (Florastor) 250 MG CAPSULE: 250 MG PO BID 7 Days #14      * Famotidine (Pepcid*) 20 MG TABLET: 20 MG PO BID 5 Days #10      * predniSONE* 20 MG TABLET: 40 MG PO QDAY 5 Days #10      New Diagnostics      * 6 Min Walk With Pulse Ox, Routine         Dx: COPD (chronic obstructive pulmonary disease) - J44.9      * PFT-Comp, PrePost,DLCO,BodyBox, Week         Dx: COPD (chronic obstructive pulmonary disease) - J44.9      * Alpha 1 Antitrypsin , Month         Dx: COPD (chronic obstructive pulmonary disease) - J44.9      * Chest W/O Cont CT, SCHEDULED PROCEDURE         Dx: COPD (chronic obstructive pulmonary disease) - J44.9      New Office Procedures      * Prevnar 0.5 PCV13, As Soon As Possible         Pneumoc 13-Olga Conj-Dip CRm/Pf (Prevnar 13 Syringe) 0.5 ML SYRINGE: 0.5        MILLILITER INTRAMUSCULARLY Qty 1 SYRINGE         Dx: COPD (chronic obstructive pulmonary disease) - J44.9       IMPRESSION:      1.  Dyspnea on exertion.      2. Cough.      3.  Wheeze.      4. COPD.  No FEV1 or alpha 1 testing on record. Has not seen Dr. Velez for a     number of years.  Appears to be have chronic bronchitis phenotype.  COPD     assessment test score is 32 today signifying very poor control of underlying     disease. He does not do well with bevespi and cannot afford it. Spiriva also did    not help. Combivent is helping, however this is not an appropriate therapy at     this time.  He will need high dose LABA ICS.      5. Tobacco abuse with cigarettes in remission.  Applauded patient for quitting     smoking two months ago, will not be eligible for lung cancer screening due to     age and is currently doing well on Chantix to avoid smoking again.              PLAN:      1.  I performed exhale nitric oxide testing in the office today.  Level of 18     indicative of no eosinophilic airway inflammation.       2.  We will get reports from Dr. Bari Velez's office in [place].      3. We will check pulmonary function test and six minute walk test to check for     airflow obstruction, bronchodilator response and exertional hypoxemia.      4. Check noncontrast chest CT now.      5. Check alpha 1 antitrypsin level and genotype.      6.  He can use Combivent as needed up to four times a day. I will start     controller inhaler therapy with Symbicort 160/4.5 two puffs twice a day. Inhaler    education provided today.  Regarding affordability, he has commercial insurance.    I will provide him a coupon which should hopefully make this free for one year.      7. We will do Prevnar today. Up-to-date with flu vaccine. We will do Pneumovax     as early as 11/2019.      8.  Follow up in 2-3 months to reassess symptoms and discuss test results.            Patient Education      Patient Education Provided:  How to use an Inhaler            Patient Education:        Chronic Obstructive Pulmonary Disease                  Disclaimer: Converted document may not contain table formatting or lab diagrams. Please see [employer] for the authenticated document.

## 2021-05-28 NOTE — PROGRESS NOTES
Patient: DYLON ESCOBAR     Acct: [id number]     Report: #[id number]  UNIT #: [id number]     : 1965    Encounter Date:05/10/2019  PRIMARY CARE: SANDRA GREGORY  ***Signed***  --------------------------------------------------------------------------------------------------------------------  Chief Complaint      Encounter Date      May 10, 2019            Primary Care Provider      SANDRA GREGORY            Referring Provider      SANDRA GREGORY            Patient Complaint      Patient is complaining of      f/u copd            VITALS      Height 5 ft 6 in / 167.64 cm      Weight 190 lbs 3 oz / 86.798176 kg      BSA 1.96 m2      BMI 30.7 kg/m2      Temperature 98.2 F / 36.78 C - Oral      Pulse 78      Respirations 14      Blood Pressure 133/88 Sitting, Right Arm      Pulse Oximetry 96%, roomair      Initial Exhaled Nitrous Oxide      Date:  2018      Exhaled Nitrous Oxide Results:  18            HPI      The patient is a very pleasant 54 year old  male former cigarette     smoker with COPD here for follow up.  Since last visit, he has been doing well     with his Symbicort. He does have bullous apical emphysema and has an appointment    coming up with [facility], being enrolled in an endobronchial valve study.     His shortness of breath is much better and he is still working full time.  He     can go up 2-3 flights of steps before having to stop because of shortness of     breath.  He can walk about 1000 feet without becoming short of breath.  He     denies any wheezing.  He does have occasional cough that is dry with no sputum     production or hemoptysis.  He is still not smoking.  He does report a mold and     methamphetamine exposure in the house he recently moved into and they had to     have the house gutted which caused transiently worsening respiratory symptoms,     but is much better.  Denies any nausea, vomiting, fevers, chills, headaches,      chest pain or hemoptysis.  He is able to perform his ADLs without difficulty.      Denies any swollen glands or lymph nodes of the head and neck.  He does note     over the last month since the weather has changed he has had increasing nasal     congestion with itchy-scratchy throat, watery eyes and a cough.  Notes a salty     taste in the back of his throat and sometimes coughs up thick sputum in the     morning.            I have personally reviewed the review of systems, past family, social, surgical     and medical histories and I agree with the findings.            ROS      Constitutional:  Denies: Fatigue, Fever, Weight gain, Weight loss, Chills,     Insomnia, Other      Respiratory/Breathing:  Denies: Shortness of air, Wheezing, Cough, Hemoptysis,     Pleuritic pain, Other      Endocrine:  Denies: Polydipsia, Polyuria, Heat/cold intolerance, Diabetes, Other      Eyes:  Denies: Blurred vision, Vision Changes, Other      Ears, nose, mouth, throat:  Denies: Congestion, Dysphagia, Hearing Changes, Nose    Bleeding, Nasal Discharge, Throat pain, Tinnitus, Other      Cardiovascular:  Denies: Chest Pain, Exertional dyspnea, Peripheral Edema,     Palpitations, Syncope, Wake up Gasping for air, Orthopnea, Tachycardia, Other      Gastrointestinal:  Denies: Abdominal pain/cramping, Bloody stools, Constipation,    Diarrhea, Melena, Nausea, Vomiting, Other      Genitourinary:  Denies: Dysuria, Urinary frequency, Incontinence, Hematuria,     Urgency, Other      Musculoskeletal:  Denies: Joint Pain, Joint Stiffness, Joint Swelling, Myalgias,    Other      Hematologic/lymphatic:  DENIES: Lymphadenopathy, Bruising, Bleeding tendencies,     Other      Neurologic:  Denies: Headache, Numbness, Weakness, Seizures, Other      Psychiatric:  Denies: Anxiety, Appropriate Effect, Depression, Other      Sleep:  No: Excessive daytime sleep, Morning Headache?, Snoring, Insomnia?, Stop    breathing at sleep?, Other      Integumentary:   Denies: Rash, Dry skin, Skin Warm to Touch, Other            FAMILY/SOCIAL/MEDICAL HX      Diabetes - Family Hx:  Mother      Is Father Still Living?:  Yes      Is Mother Still Living?:  No       Family History:  Yes      Social History:  No Tobacco Use, No Alcohol Use, No Recreational Drug use      Smoking status:  Former smoker (1.5 ppd x 40 y quit 2018)      Anticoagulation Therapy:  No      Antibiotic Prophylaxis:  No      Medical History:  Yes: Chronic Bronchitis/COPD (INHALER), Shortness Of Breath;     No: Arthritis, Asthma, Blood Disease, Chemotherapy/Cancer, Congestive Heart     Failu, Deafness or Ringing Ears, Diabetes, Seizures, Heart Attack, Hemor    rhoids/Rectal Prob, High Blood Pressure, Miscellaneous Medical/oth      Psychiatric History      none            PREVENTION      Hx Influenza Vaccination:  Yes      Date Influenza Vaccine Given:  Nov 1, 2018      Influenza Vaccine Declined:  No      2 or More Falls Past Year?:  No      Fall Past Year with Injury?:  No      Hx Pneumococcal Vaccination:  Yes      Encouraged to follow-up with:  PCP regarding preventative exams.      Chart initiated by      cheryl/ ma            ALLERGIES/MEDICATIONS      Allergies:        Coded Allergies:             NO KNOWN ALLERGIES (Unverified , 5/10/19)      Medications    Last Reconciled on 5/10/19 09:46 by AMADOR CAGE MD MDI-Ipratropium/Albuterol (Combivent Respimat) 4 Gm Inh      1 PUFF INH RTQ6H PRN for DYSPNEA/WHEEZING, #1 INH 5 Refills         Prov: AMADOR CAGE         5/10/19       Montelukast Sodium (Singulair*) 10 Mg Tab      10 MG PO QDAY, #30 TAB 4 Refills         Prov: AMADOR CAGE         5/10/19       Azelastine/Fluticasone (Dymista Nasal Spray) 23 Gm Spray.pump      1 SPRAYS NARE EACH BID, #1 BOTTLE 3 Refills         Prov: AMADOR CAGE         5/10/19       NEB-Albuterol Sulf (Albuterol) 2.5 Mg/3 Ml Vial.neb      2.5 MG INH Q4H PRN for SHORTNESS OF BREATH, #120 NEB 0 Refills          Reported         5/10/19       Budesonide/Formoterol Fumarate (Symbicort 160/4.5 Mcg) 10.2 Gm Inh      2 PUFF INH BID, #1 INH 5 Refills         Prov: AMADOR CAGE         11/28/18       Varenicline Tartrate (Chantix) 1 Mg Tablet      1 MG PO BID for 30 Days, #60 TAB         Reported         11/28/18      Current Medications      Current Medications Reviewed 5/10/19            EXAM      Vital Signs Reviewed.      General:  WDWN, Alert, NAD.      HEENT: EOMI.  PERRLA, OP with posterior pharyngeal erythema, nares with boggy     turbinates with erythema, but no exudates, no sinus tenderness.        Neck: Supple, no JVD, no thyromegaly.      Lymph: No axillary, cervical, supraclavicular lymphadenopathy noted bilaterally.      Chest: Barrel chested, poor aeration, quiet breath sounds throughout, tympanic     to percussion bilaterally, no work of breathing noted.      CV: RRR, no MGR, pulses 2+, equal.        Abd: Soft, NT, ND, +BS, no HSM.      EXT: No clubbing, no cyanosis, no edema, no joint tenderness.        Neuro:  A  Skin: No rashes or lesions.      Vitals      Vitals:             Height 5 ft 6 in / 167.64 cm           Weight 190 lbs 3 oz / 86.118009 kg           BSA 1.96 m2           BMI 30.7 kg/m2           Temperature 98.2 F / 36.78 C - Oral           Pulse 78           Respirations 14           Blood Pressure 133/88 Sitting, Right Arm           Pulse Oximetry 96%, roomair            REVIEW      Results Reviewed      PCCS Results Reviewed?:  Yes Prev Lab Results, Yes Prev Radiology Results, Yes     Previous [facility] Records      Lab Results      I personally reviewed my last office note.            Assessment      Notes      New Medications      * NEB-Albuterol Sulf (Albuterol) 2.5 MG/3 ML VIAL.NEB: 2.5 MG INH Q4H PRN       SHORTNESS OF BREATH #120         Instructions: DIAGNOSIS CODE REQUIRED PRIOR TO PRESCRIBING.      * AZELASTINE/FLUTICASONE (Dymista Nasal [place]) 23 GM SPRAY.PUMP: 1 SPRAYS NARE       EACH BID  #1      * Montelukast Sodium (Singulair*) 10 MG TAB: 10 MG PO QDAY #30      * MDI-Ipratropium/Albuterol (Combivent Respimat) 4 GM INH: 1 PUFF INH RTQ6H PRN       DYSPNEA/WHEEZING #1         Instructions: Do not exceed 6 inhalations per day.      IMPRESSION:      1.  Chronic dyspnea on exertion at baseline.      2.  Chronic cough.      3.  Very severe COPD with postbronchodilator FEV1 of 30%, alpha 1 antitrypsin     genotype MM.  Has bullous apical emphysema with homogenous pattern.  He is on     Symbicort and doing well.  COPD assessment test score is 10 today signifying     adequate control of underlying disease on current therapies.  He is being     eval[facility]      4. Tobacco abuse with cigarettes in remission, quit smoking over a year ago.      5. Obesity, BMI 30.7.      6.  Seasonal allergies, poorly controlled.      7.  Allergic rhinitis, poorly controlled.            PLAN:      1.  Stop Flonase, start Dymista nasal spray twice a day.      2.  Start Singulair 10 mg daily.  I recommended him taking OTC Zyrtec.      3. Continue Symbicort 160/4.5 two puffs twice a day.  We will add rescue inhaler    with Combivent up to four times a day.      4. Up-to-date with flu, Prevnar and Pneumovax.      5. Diet and weight loss encouraged.        6. Continue to follow up with [facility] for his endobronchial valve evaluation.  He    has an appointment coming up with them in the following week.      7.  Have patient follow up with me in 2-3 months.            Patient Education      ACO BMI High above 25:  Encouraged weight loss, Encourage dietary changes            Patient Education:        Allergic Rhinitis                 Disclaimer: Converted document may not contain table formatting or lab diagrams. Please see [employer] for the authenticated document.

## 2021-05-28 NOTE — PROGRESS NOTES
Patient: AVELINO ESCOBAR     Acct: [id number]     Report: #[id number]  UNIT #: [id number]     : 1965    Encounter Date:10/03/2019  PRIMARY CARE: SANDRA GREGORY  ***Signed***  --------------------------------------------------------------------------------------------------------------------  Chief Complaint      Encounter Date      Oct 3, 2019            Primary Care Provider      SANDRA GREGORY            Referring Provider      SANDRA GREGORY            Patient Complaint      Patient is complaining of      [facility] f/u copd            TRANSITION OF CARE 14D      Transition of Care      [facility] 14 Day Followup      Avelino presents to office for follow up post discharge from inpatient status     within 14 calendar days. Patient was contacted within 2 business days via phone     conversation. Documentation of that phone call is present in the patient's elect    ronic chart. He  was admitted to inpatient facility on (19) and was     discharged on (19) due to:  .copd            Admitting MD: steffanie            His  discharge summary has been reviewed and placed in the patient's electronic     chart.            Problem List      Problem List Reviewed      Patient's problem list has been reviewed from patient discharge summary.            Medications Reviewed      Meds Reviewed      Patient €™s outpatient medication list has been reconciled with the medication     list from the discharge summary and has been reviewed with the patient.            VITALS      Height 5 ft 6 in / 167.64 cm      Weight 184 lbs 8 oz / 83.165829 kg      BSA 1.93 m2      BMI 29.8 kg/m2      Temperature 98.2 F / 36.78 C - Oral      Pulse 79      Respirations 14      Blood Pressure 132/73 Sitting, Right Arm      Pulse Oximetry 94%, room air      Initial Exhaled Nitrous Oxide      Date:  2018      Exhaled Nitrous Oxide Results:  18            HPI      I reviewed the transition of care notes including the  patient's discharge     summary.            The patient is a 54 year old  male former cigarette smoker with severe     emphysema here for hospital follow up.  Since the last time I saw him, the     patient was hospitalized. He ended up in the hospital with a COPD exacerbation     and acute hypoxemic respiratory failure. This was secondary to what appears to     be MRSA bronchitis. He is finishing up a fourteen day course of Bactrim. During     his hospitalization, he was originally on BiPAP and then Airvo with high flow     oxygen and then weaned down towards nasal cannula. He is currently off oxygen.     He is not back to work.  He is on inhaler therapy, but unfortunately he started     smoking before his hospitalization and he is getting endobronchial valves placed    in January which has been moved back to February. His breathing is doing     somewhat better, not quite back back to baseline.  He gets short of breath going    up a flight of steps or walking  feet, moderate in severity, worse with     exertion and relieved with rest.  His cough is decreased and back to baseline.     It is dry with no sputum production or hemoptysis. He denies any wheezing. He is    still very fatigued and is slowly recovering.  He again has stopped smoking and     is trying to be four months cigarette free to get endobronchial valves placed.      Denies any nausea, vomiting, fevers, chills or headaches.  He does have chest     pain, it is left sided, substernal, 2/10 in severity, worse with exertion and     relieved with rest.  He is able to perform ADLs without difficulty.  His wife is    worried about heart disease as he has never every had a cardiac workup. He is     able to perform ADLs without difficulty.  Denies any swollen glands or lymph     nodes of the head and neck.            I have personally reviewed the review of systems, past family, social, surgical     and medical histories and I agree with the  findings.            ROS      Constitutional:  Denies: Fatigue, Fever, Weight gain, Weight loss, Chills,     Insomnia, Other      Respiratory/Breathing:  Complains of: Shortness of air, Wheezing, Cough; Denies:    Hemoptysis, Pleuritic pain, Other      Endocrine:  Denies: Polydipsia, Polyuria, Heat/cold intolerance, Diabetes, Other      Eyes:  Denies: Blurred vision, Vision Changes, Other      Ears, nose, mouth, throat:  Denies: Congestion, Dysphagia, Hearing Changes, Nose    Bleeding, Nasal Discharge, Throat pain, Tinnitus, Other      Cardiovascular:  Denies: Chest Pain, Exertional dyspnea, Peripheral Edema,     Palpitations, Syncope, Wake up Gasping for air, Orthopnea, Tachycardia, Other      Gastrointestinal:  Denies: Abdominal pain/cramping, Bloody stools, Constipation,    Diarrhea, Melena, Nausea, Vomiting, Other      Genitourinary:  Denies: Dysuria, Urinary frequency, Incontinence, Hematuria,     Urgency, Other      Hematologic/lymphatic:  DENIES: Lymphadenopathy, Bruising, Bleeding tendencies,     Other      Neurologic:  Denies: Headache, Numbness, Weakness, Seizures, Other      Psychiatric:  Denies: Anxiety, Appropriate Effect, Depression, Other      Sleep:  No: Excessive daytime sleep, Morning Headache?, Snoring, Insomnia?, Stop    breathing at sleep?, Other      Integumentary:  Denies: Rash, Dry skin, Skin Warm to Touch, Other            FAMILY/SOCIAL/MEDICAL HX      Surgical History:  No: Abdominal Surgery, Appendectomy, Bladder Surgery, Bowel     Surgery, CABG, Cholecystectomy, Head Surgery, Oral Surgery, Orthopedic Surgery,     Vascular Surgery      Diabetes - Family Hx:  Mother      Is Father Still Living?:  Yes      Is Mother Still Living?:  No       Family History:  Yes      Social History:  No Tobacco Use, No Alcohol Use, No Recreational Drug use      Smoking status:  Former smoker (1.5 ppd x 40 y quit 2018)      Anticoagulation Therapy:  No      Antibiotic Prophylaxis:  No      Medical History:   Yes: Arthritis, Chronic Bronchitis/COPD, Shortness Of Breath;     No: Asthma, Blood Disease, Chemotherapy/Cancer, Congestive Heart Failu, Deafness    or Ringing Ears, Diabetes, Seizures, Heart Attack, Hemorrhoids/Rectal Prob, High    Blood Pressure      Psychiatric History      none            PREVENTION      Hx Influenza Vaccination:  Yes      Date Influenza Vaccine Given:  Nov 1, 2018      Influenza Vaccine Declined:  No      2 or More Falls Past Year?:  No      Fall Past Year with Injury?:  No      Hx Pneumococcal Vaccination:  Yes      Encouraged to follow-up with:  PCP regarding preventative exams.      Chart initiated by      farzaneh betts            ALLERGIES/MEDICATIONS      Allergies:        Coded Allergies:             NO KNOWN ALLERGIES (Unverified , 10/3/19)      Medications    Last Reconciled on 10/3/19 13:44 by AMADOR CAGE MD      NEB-Albuterol Sulf (Albuterol) 2.5 Mg/3 Ml Vial.neb      2.5 MG INH Q4H PRN for SHORTNESS OF BREATH, #120 NEB 3 Refills         Prov: AMADOR CAGE         10/2/19       Nicotine 21 Mg Patch (Nicoderm 21 Mg Patch) 1 Each Patch.td24      21 MG TRANSDERM QDAY for 14 Days, #14 PATCH         Prov: GERTRUDIS CHICAS         9/25/19       Trimethoprim/Sulfamethoxazole DS (Bactrim /160 MG) 1 Each Tablet      1 TAB PO BID for 10 Days, #20 TAB 0 Refills         Prov: GERTRUDIS CHICAS         9/25/19       predniSONE* (predniSONE*) 20 Mg Tablet      20 MG PO ASDIR, #20 TAB 0 Refills         Prov: GERTRUDIS CHICAS         9/25/19       Tamsulosin HCL (Tamsulosin HCL) 0.4 Mg Capsule      0.4 MG PO QDAY, #30 CAP 0 Refills         Reported         9/21/19       Budesonide/Formoterol Fumarate (Symbicort 160/4.5 Mcg) 10.2 Gm Inh      2 PUFF INH QDAY, #1 INH 0 Refills         Reported         9/21/19       MDI-Ipratropium/Albuterol (Combivent Respimat) 4 Gm Inh      2 PUFF INH RTQ4H, #1 INH         Reported         9/21/19       Nebulizer Accessories (Nebulizer Mouthpiece) 1 Each Each      EACH XX  ONCE, #1 0 Refills         Prov: AMADOR CAGE         8/5/19       Nebulizer Accessories (Nebulizer Kit DARRYL) 1 Each Kit      EACH XX ONCE, #1 0 Refills         Prov: AMADOR CAGE         8/5/19       Nebulizer/Compressor (Nebulizer) 1 Each Each      EACH XX ONCE, #1 0 Refills         Prov: AMADOR CAGE         8/5/19      Current Medications      Current Medications Reviewed 10/3/19            EXAM      Vital Signs Reviewed.      General:  WDWN, Alert, NAD.      HEENT: PERRL, EOMI.  OP, nares clear, no sinus tenderness.      Neck: Supple, no JVD, no thyromegaly.      Chest: Barrel chested, poor aeration with quite breath sounds throughout, clear     to auscultation bilaterally, no work of breathing.        CV: RRR, no MGR, pulses 2+, equal.        Abd: Soft, NT, ND, +BS, no HSM.      EXT: No clubbing, no cyanosis, no edema, no joint tenderness.        Neuro:  A  Skin: No rashes or lesions.      Vitals      Vitals:             Height 5 ft 6 in / 167.64 cm           Weight 184 lbs 8 oz / 83.078810 kg           BSA 1.93 m2           BMI 29.8 kg/m2           Temperature 98.2 F / 36.78 C - Oral           Pulse 79           Respirations 14           Blood Pressure 132/73 Sitting, Right Arm           Pulse Oximetry 94%, room air            REVIEW      Results Reviewed      PCCS Results Reviewed?:  Yes Prev Lab Results, Yes Prev Radiology Results, Yes     Previous [facility] Records      Lab Results      I reviewed patient's discharge summary, my consult note and our hospital notes.     I reviewed the patient's sputum culture growing MRSA. I reviewed the patient's     noncontrast chest CT in 07/2019.  I also reviewed labs showing no peripheral     eosinophilia and no evidence of chronic hypercapnic respiratory failure.      Radiographic Results               [facility]                PACS RADIOLOGY REPORT            Patient: DYLON ESCOBAR   Acct: #[id number]    Report: #[id number]            UNIT #: [id number]    DOS: 19 0715      INSURANCE:[employer] - P[employer]   ORDER #:[id number]      LOCATION:[facility]  0104-01   : 1965            PROVIDERS      ADMITTING:  GERTRUDIS CHICAS   ATTENDING: GERTRUDIS CHICAS      FAMILY:  SANDRA GREGORY co   ORDERING:  AMADOR CAGE         OTHER:    DICTATING:  Shirley Daley MD            REQ #:[id number]   EXAM:CHW - CT CHEST with CONTRAST      REASON FOR EXAM:  rule out PE      REASON FOR VISIT:  COPD EXACERBATION            *******Signed******         PROCEDURE:   CT CHEST W/ CONTRAST             COMPARISON:   [facility], CR, CHEST AP/PA 1 VIEW, 2019,     18:18.  [facility], CT, CHEST W/O CONTRAST, 2019, 11:14.             INDICATIONS:   GENERALIZED CHEST PAIN WITH SHORTNESS OF BREATH             TECHNIQUE:   After obtaining the patient's consent, CT images were obtained with    non-ionic       intravenous contrast material.               PROTOCOL:     Pulmonary embolism imaging protocol performed                RADIATION:     DLP: 718mGy*cm          Automated exposure control was utilized to minimize radiation dose.       CONTRAST:   100cc Isovue 370 I.V.             FINDINGS:         The pulmonary arteries are well opacified.  No filling defects are seen.  There     are no findings to       suggest pulmonary thromboembolic disease.             Lung window images reveal marked emphysema, with bullous changes at the lung     apices.             Tiny nodule in the left upper lobe is stable.  No new or suspicious nodule is     evident.             Mediastinal windows reveal no mediastinal, hilar, or axillary adenopathy.  Mild     anterior wedging       deformity of a single lower thoracic vertebral body is stable.             CONCLUSION:         CT scan of the chest with IV contrast demonstrating no findings to suggest PE.             Marked emphysema.              SHIRLEY  MD REE             Electronically Signed and Approved By: SHIRLEY KEENAN MD on 9/22/2019 at 8:34                        Until signed, this is an unconfirmed preliminary report that may contain      errors and is subject to change.                                              COUST:      D:09/22/19 0834            Assessment      Chest pain         Chest pain on breathing - R07.1         Chest pain type: chest pain on breathing            Notes      New Diagnostics      * Echo Complete, Week         Dx: Chest pain - R07.9      * Ekg Std [facility] [id number] [facility], As Soon As Possible         Dx: Chest pain - R07.9      New Office Procedures      * Flu Vacc Fluarix Quadrivalent, Stat         Flu Vacc [id number](6Mos Up)/Pf (Fluarix Quadrivalent [id number] Syringe) 60        MCG/0.5 ML SYRINGE: 60 MICROGRAM INTRAMUSCULARLY Qty 1 SYRINGE         Dx: Chest pain - R07.9      IMPRESSION:      1.  Acute hypoxemic respiratory failure, resolved.      2. Acute exacerbation of severe COPD, resolved, almost back to baseline, has     very severe COPD with FEV1 30% of predicted, alpha 1 antitrypsin genotype MM. He    has bullous apical emphysema in homogenous pattern.  He is doing well on triple     inhaler therapy.  COPD assessment test score is 20 signifying poor control of     underlying disease.  This is getting better with time. He is being evaluated for    endobronchial valve trial at [facility].        3. Tobacco abuse with cigarettes.      4.  Seasonal allergies, well-controlled.      5.  Allergic rhinitis, well-controlled.              PLAN:      1.  Continue current inhaler regimen.      2.  Continue Dymista and Singulair.      3.  Continue to follow at [facility] for potential endobronchial valves.  He needs     to be four months cigarette free to have these performed.      4. He has completed treatment for MRSA bronchitis, this should not limit his     ability to have endobronchial valve placed.      5. He is up-to-date with Prevnar  and Pneumovax. I will give him flu shot today.      6.  Smoking cessation counseling provided.  I spent 4 minutes today counseling     the patient on risks of smoking, including throat cancer, lung cancer, COPD,     heart disease and death. I also discussed the benefits of quitting.  He will     continue to cut back on the smoking and stop for four months.  His last     cigarette was two days ago. He is on nicotine patches which are helping him.      7.  We will check EKG and echocardiogram given chest pain.      8. We will have patient follow up with me in 4-6 weeks.            Patient Education      Tobacco Cessation Counseling:  for 3 - 10 minutes      Patient Education Provided:  COPD, Smoking Cessation                 Disclaimer: Converted document may not contain table formatting or lab diagrams. Please see [facility] for the authenticated document.

## 2021-05-28 NOTE — PROGRESS NOTES
Patient: DYLON ESCOBAR     Acct: [id number]     Report: #[id number]  UNIT #: [id number]     : 1965    Encounter Date:02/15/2019  PRIMARY CARE: SANDRA GREGORY  ***Signed***  --------------------------------------------------------------------------------------------------------------------  Chief Complaint      Encounter Date      Feb 15, 2019            Primary Care Provider      SANDRA GREGORY            Referring Provider      SANDRA GREGORY            Patient Complaint      Patient is complaining of      f/u copd            VITALS      Height 5 ft 6 in / 167.64 cm      Weight 188 lbs 3 oz / 85.14768 kg      BSA 1.95 m2      BMI 30.4 kg/m2      Temperature 98.4 F / 36.89 C - Oral      Pulse 99      Respirations 12      Blood Pressure 132/90 Sitting, Right Arm      Pulse Oximetry 95%, roomair      Initial Exhaled Nitrous Oxide      Date:  2018      Exhaled Nitrous Oxide Results:  18            HPI      The patient is a very pleasant 53 year old  male former cigarette     smoker with emphysema here for follow up.  Since last visit, I started him on     Symbicort and he is able to afford it with drug coverage and coupon and samples     and he is feeling somewhat better.  His shortness of breath is better.  He can     still walk up to about two flights of steps without having to stop and can only     walk flat for about 1000 feet without having to stop because of shortness of     breath.  Unfortunately, he cannot go up hill or walk up a flight of steps     without having to stop because of shortness of breath.  His wheeze is resolved     and his cough is much better since starting the Symbicort.  He has an     intermittent cough throughout the day that is dry with no sputum production or     hemoptysis.  He denies any wheezing. He denies changes in his weight, orthopnea,    leg swelling or PND.  Denies any nausea, vomiting, fevers, chills, headaches,      or chest  pain.  Denies any seasonal allergies, itchy-scratchy throat, watery     eyes or nasal congestion.  Since last visit, alpha 1 antitrypsin testing was     done and was unremarkable.  I did do a chest CT which showed severe emphysema     which the bulk of it appears to be upper lobe predominant bullous emphysema and     homogenous emphysema.  There is a tiny 4 mm left lower lobe noncalcified nodule.    PFT showed very severe airflow obstruction on the postbronchodilator, FEV1 of     30% of predicted with severe reduction in diffusion capacity.  No desaturations     were noted on walk test. He quit smoking over a year ago. He is able to perform     his ADLs without difficulty.  Denies any swollen glands or lymph nodes of the     head and neck.            I have personally reviewed the review of systems, past family, social, surgical     and medical histories and I agree with the findings.            ROS      Constitutional:  Denies: Fatigue, Fever, Weight gain, Weight loss, Chills,     Insomnia, Other      Respiratory/Breathing:  Complains of: Cough; Denies: Shortness of air, Wheezing,    Hemoptysis, Pleuritic pain, Other      Endocrine:  Denies: Polydipsia, Polyuria, Heat/cold intolerance, Diabetes, Other      Eyes:  Denies: Blurred vision, Vision Changes, Other      Ears, nose, mouth, throat:  Denies: Congestion, Dysphagia, Hearing Changes, Nose    Bleeding, Nasal Discharge, Throat pain, Tinnitus, Other      Cardiovascular:  Denies: Chest Pain, Exertional dyspnea, Peripheral Edema,     Palpitations, Syncope, Wake up Gasping for air, Orthopnea, Tachycardia, Other      Gastrointestinal:  Denies: Abdominal pain/cramping, Bloody stools, Constipation,    Diarrhea, Melena, Nausea, Vomiting, Other      Genitourinary:  Denies: Dysuria, Urinary frequency, Incontinence, Hematuria,     Urgency, Other      Musculoskeletal:  Denies: Joint Pain, Joint Stiffness, Joint Swelling, Myalgias,    Other      Hematologic/lymphatic:   DENIES: Lymphadenopathy, Bruising, Bleeding tendencies,     Other      Neurologic:  Denies: Headache, Numbness, Weakness, Seizures, Other      Psychiatric:  Denies: Anxiety, Appropriate Effect, Depression, Other      Sleep:  No: Excessive daytime sleep, Morning Headache?, Snoring, Insomnia?, Stop    breathing at sleep?, Other      Integumentary:  Denies: Rash, Dry skin, Skin Warm to Touch, Other            FAMILY/SOCIAL/MEDICAL HX      Diabetes - Family Hx:  Mother      Is Father Still Living?:  Yes      Is Mother Still Living?:  No       Family History:  Yes      Social History:  No Tobacco Use, No Alcohol Use, No Recreational Drug use      Smoking status:  Former smoker (1.5 ppd x 40 y quit 2018)      Anticoagulation Therapy:  No      Antibiotic Prophylaxis:  No      Medical History:  Yes: Chronic Bronchitis/COPD (INHALER), Shortness Of Breath;     No: Arthritis, Asthma, Blood Disease, Chemotherapy/Cancer, Congestive Heart     Failu, Deafness or Ringing Ears, Diabetes, Seizures, Heart Attack,     Hemorrhoids/Rectal Prob, High Blood Pressure, Miscellaneous Medical/oth      Psychiatric History      none            PREVENTION      Date Influenza Vaccine Given:  Nov 1, 2018      2 or More Falls Past Year?:  No      Fall Past Year with Injury?:  No      Hx Pneumococcal Vaccination:  Yes      Encouraged to follow-up with:  PCP regarding preventative exams.      Chart initiated by      katina brown/ ma            ALLERGIES/MEDICATIONS      Allergies:        Coded Allergies:             NO KNOWN ALLERGIES (Unverified , 2/15/19)      Medications    Last Reconciled on 2/15/19 09:00 by AMADOR CAGE MD      Budesonide/Formoterol Fumarate (Symbicort 160/4.5 Mcg) 10.2 Gm Inh      2 PUFF INH BID, #1 INH 5 Refills         Prov: AMADOR CAGE         11/28/18       Varenicline Tartrate (Chantix) 1 Mg Tablet      1 MG PO BID for 30 Days, #60 TAB         Reported         11/28/18       Tamsulosin HCL (Tamsulosin) 0.4 Mg Capsule       0.4 MG PO HS, #30 CAP 0 Refills         Reported         18      Current Medications      Current Medications Reviewed 2/15/19            EXAM      Vital Signs Reviewed.      General:  WDWN, Alert, NAD.      HEENT: PERRL, EOMI.  OP, nares clear, no sinus tenderness.      Chest: Barrel chested, poor aeration, diminished breath sounds the upper     posterior chest with bibasilar rhonchi bilateral bases, tympanic to percussion     bilaterally, no work of breathing noted.        CV: RRR, no MGR, pulses 2+, equal.        Abd: Obese, soft, NT, ND, +BS, no HSM.      EXT: No clubbing, no cyanosis, no edema.        Neuro:  A  Skin: No rashes or lesions.      Vitals      Vitals:             Height 5 ft 6 in / 167.64 cm           Weight 188 lbs 3 oz / 85.82947 kg           BSA 1.95 m2           BMI 30.4 kg/m2           Temperature 98.4 F / 36.89 C - Oral           Pulse 99           Respirations 12           Blood Pressure 132/90 Sitting, Right Arm           Pulse Oximetry 95%, roomair            REVIEW      Results Reviewed      PCCS Results Reviewed?:  Yes Prev Lab Results, Yes Prev Radiology Results, Yes     Previous Mecial Records      Lab Results      I reviewed my last office note.  I reviewed alpha 1 antitrypsin testing with     normal genotype MM.  I personally reviewed the patient's chest CT, pulmonary     function testing and six minute walk test.      Radiographic Results               [facility]                PACS RADIOLOGY REPORT            Patient: DYLON ESCOBAR   Acct: #[id number]   Report: #[id number]            UNIT #: [id number]    DOS: 18 1517      INSURANCE:[facility] NETWORK - PPO   ORDER #:[id number]      LOCATION:[facility]     : 1965            PROVIDERS      ADMITTING:     ATTENDING: AMADOR CAGE      FAMILY:  SANDRA GREGORY   ORDERING:  AMADOR CAGE         OTHER:    DICTATING:  Grant Freed MD             [facility] #:[id number]   [facility]      REASON FOR EXAM:        REASON FOR VISIT:  COPD            *******Signed******         PROCEDURE:   CT CHEST WITHOUT CONTRAST             COMPARISON:   [facility], CR, CHEST AP/PA 1 VIEW, 1/26/2018, 9:44.             INDICATIONS:   COPD, COUGH, DYSPNEA             TECHNIQUE:   CT images were created without the administration of contrast     material.               PROTOCOL:     Standard imaging protocol performed                RADIATION:     DLP: 515mGy*cm          Automated exposure control was utilized to minimize radiation dose.              FINDINGS:         There is no mediastinal, hilar, or axillary adenopathy.  There are calcified     subcarinal lymph nodes       compatible with changes of granulomatous disease.  There are no pleural     effusions.  There is severe       emphysematous change of the lungs.  Within the anterior left lower lobe there is    a 4 mm subpleural       noncalcified nodule.  Repeat noncontrast CT scan of the chest would be     recommended in 12 months to       document stability.  There is a 3 mm noncalcified nodule within the left upper     lobe axial image 37       no other noncalcified pulmonary nodule identified.  No focal airspace     consolidation is seen.        Bilateral adrenal glands appear within normal limits.  Within the left lobe of     the liver there is a       well-circumscribed 1.5 cm low-density mass favored to be a cyst or hemangioma in    the absence of a       known malignancy.  Visualized upper abdomen is otherwise unremarkable.  There     are degenerative       changes of the spine.  No lytic or sclerotic bony lesion identified.  There is a    T10 compression       fracture with approximate 30% loss of height.  This appears old.      CONCLUSION:         1. Severe emphysema      2. Noncalcified subcentimeter pulmonary nodules within the left lung.  Repeat     noncontrast CT scan        of the chest is recommended in 12 months      3. Old T10 compression fracture              TAN RICHEY MD             Electronically Signed and Approved By: TAN RICHEY MD on 2018 at 16:56                           Until signed, this is an unconfirmed preliminary report that may contain      errors and is subject to change.                                              STEBA:      D:18 1656      PFT Results      Patient: AVELINO ESCOBAR   Acct: #[id number]   Report: #[id number]            UNIT #: [id number]    DOS:       LOCATION:[facility]     : 1965            PROVIDERS      ADMITTING:     FAMILY:  SANDRA GREGORY         OTHER:       DICTATING:  AMADOR CAGE MD            REASON FOR VISIT:  COPD            *******Signed******                                    [facility]                          Health Information Management Services                            [place], [place]  [address]               __________________________________________________________________________             Patient Name:                   Attending Physician:      Avelino Escobar M.D.             Patient Visit # MR #            Admit Date  Disch Date     Location      [id number]    [id number]      2018                 [facility]- -             Date of Birth      1965      __________________________________________________________________________      0821 - DIAGNOSTIC REPORT             PULMONARY FUNCTION TEST             DATE OF STUDY:      2018.             SPIROMETRY:      Very severe airflow obstruction is present.      Post bronchodilator FEV1 is 1.03 L, 30% predicted.      There is no bronchodilator response noted.             FLOW VOLUME LOOP:      Flow volume loop shows a very severe airflow obstruction.             LUNG VOLUMES:      No evidence of hyperinflation.  There is air trapping noted.             DIFFUSION CAPACITY:       Diffusion capacity is moderately reduced to 45% predicted.             OVERALL IMPRESSION:      1.  Very severe airflow obstruction with no bronchodilator response present.      2.  Air trapping present.      3.  Diffusion capacity moderately reduced.      4.  These findings can be seen in underlying very severe COPD.             To be electronically signed in [facility]      [id number] AMADOR CAGE M.D.             AM:rt      D:  2018 11:01      T:  2018 12:34      #[id number]             Until signed, this is an unconfirmed preliminary report that may contain      errors and is subject to change.                   Until signed, this is an unconfirmed preliminary report that may contain      errors and is subject to change.                     <Electronically signed by AMADOR CAGE MD>                18 1247               AMADOR CAGE MDAA:RJT      D:18 1101      Patient: LUZAVELINO JAIDEN   Acct: #[id number]   Report: #[id number]            UNIT #: [id number]    DOS:       LOCATION:[facility]     : 1965            PROVIDERS      ADMITTING:     FAMILY:  SANDRA troyyvette GREGORY         OTHER:       DICTATING:  AMADOR CAGE MD            REASON FOR VISIT:  COPD            *******Signed******                                    [facility]                          Health Information Management Services                            [place], [place]  [address]               __________________________________________________________________________             Patient Name:                   Attending Physician:      Avelino Lowry M.D.             Patient Visit # MR #            Admit Date  Disch Date     Location      [id number]    [id number]      2018                 [facility]- -             Date of Birth      1965       __________________________________________________________________________      0821 - DIAGNOSTIC REPORT             SIX-MINUTE WALK TEST             DATE OF TEST:      12/21/2018.             TEST PROCEDURE:      The 6-minute walk test was performed in accordance with [employer]      [employer] criteria.             INTERPRETATION:      1. The patient walked 6 minutes with a total of 1480 feet with average MET          level 3.15.      2.  There was some mild exertion-related hypoxemia desaturation with          submaximal exertion.  SaO2 decreased from 95 to 92 during walk.      3.  Heart rate increased appropriately from 96 to 126 during walk.      4.  Shahrzad dyspnea scale increased from zero to 2 during walk.             IMPRESSION:      No indication for exertional oxygen therapy in this patient.             To be electronically signed in [employer]      [id number] AMADOR CAGE M.D.             AM:rt      D:  12/27/2018 11:02      T:  12/27/2018 12:39      #[id number]             Until signed, this is an unconfirmed preliminary report that may contain      errors and is subject to change.                   Until signed, this is an unconfirmed preliminary report that may contain      errors and is subject to change.                     <Electronically signed by AMADOR CAGE MD>                12/27/18 1247               AMADOR CAGE MDAA:RJT      D:12/27/18 1102            Assessment      IMPRESSION:      1.  Dyspnea on exertion, improved.      2.  Cough.      3. Wheeze, resolved.      4. Very severe COPD with postbronchodilator FEV1 of 30% of predicted.  Alpha 1     testing  normal genotype MM. CT shows what appears to be bullous emphysema with     homogenous pattern emphysema.  He is currently on Symbicort and feels much     better since starting this, Spiriva and Combivent did not help.  COPD assessment    test score is 11 today  signifying adequate control of underlying disease on     current therapies. May be a candidate for endobronchial valves based off lung     function, chest CTs and symptoms.      5. Tobacco abuse with cigarettes in remission, quit smoking over a year ago.      6. Obesity, BMI 30.4.               PLAN:      1.  Discussed test results with patient.  Given very severe COPD, homogenous     pattern of emphysema with upper lobe predominant, he may be a candidate for     endobronchial valves.  I will contact [facility] to get patient enrolled in     endobronchial valve study to see if he is a candidate for endobronchial valve     placement.      2.  Continue Symbicort 160/4.5 two puffs twice a day. I offered LAMA therapy at     this time with Spiriva, however he declines as he is doing well on Symbicort.      3. Continue albuterol as needed.      4. Up-to-date with flu, Prevnar and Pneumovax.      5.  I spent 3 minutes counseling the patient on diet and exercise.  I encouraged    30-60 minutes of daily exercise and a 1800 calorie a day low fat diet.  The     patient verbalized understanding and will make attempts to lose weight.        6. I offered the patient pulmonary rehab given his breathlessness and lung     function.  Unfortunately,  he declined given he works full time for [employer]    [employer].            Patient Education      ACO BMI High above 25:  Counseling Given, Encouraged weight loss, Encourage     dietary changes      Patient Education Provided:  COPD      Other Education:  EBV study                 Disclaimer: Converted document may not contain table formatting or lab diagrams. Please see [employer] for the authenticated document.

## 2021-06-05 NOTE — PROGRESS NOTES
Progress Note      Patient Name: Avelino Lowry   Patient ID: [id number]   Sex: Male   YOB: 1965    Primary Care Provider: Desmond Lloyd MD   Referring Provider: Desmond Lloyd MD    Visit Date: May 11, 2021    Provider: Desmond Lloyd MD   Location: [facility]   Location Address: [address] [place] [address]   [place]  [address]   Location Phone: [phone]          Chief Complaint     Legs and feet swelling x 2 months       History Of Present Illness  Avelino Lowry is a 56 year old /White male who presents for evaluation and treatment of:      pt has seen cardiology and had stress test and echo and heart related issues were ruled out  pt has good pulses ruling out circulation problems  pt has had negative ultrasound for blood clots  pt still has mild edema in both legs- will prescribe jobst stockings and pt will now try these- if stockings do not work well enough, pt will call and may refer to vascular surgeon at that point  pt improving diet and drinking more water and lowering salt in diet       Past Medical History  Disease Name Date Onset Notes   COPD (chronic obstructive pulmonary disease) --  --          Medication List  Name Date Started Instructions   albuterol sulfate 2.5 mg /3 mL (0.083 %) inhalation solution for nebulization  inhale 3 milliliters (2.5 mg) by nebulization route 4 times per day   escitalopram oxalate 10 mg oral tablet 02/18/2021 TAKE ONE TABLET BY MOUTH EVERY DAY FOR FOR ANXIETY   hydrochlorothiazide 12.5 mg oral tablet 03/15/2021 TAKE ONE TABLET BY MOUTH EVERY DAY   Lexapro 10 mg oral tablet 09/14/2020 take 1 tablet by oral route daily for 30 days for anxiety   ProAir HFA 90 mcg/actuation inhalation HFA aerosol inhaler  inhale 1 - 2 puffs (90 - 180 mcg) by inhalation route every 4-6 hours as needed   pseudoephedrine HCl 60 mg oral tablet 11/04/2020 take 1 tablet by oral route 2 times a day for 90 days   Stop Smoking Aid buccal  take 1-3 s  by buccal route daily   tamsulosin 0.4 mg oral capsule 02/18/2021 TAKE ONE CAPSULE BY MOUTH EVERY DAY 30 minutes following THE same meal each DAY   Ana Daniels 100-62.5-25 mcg inhalation blister with device 04/16/2020 inhale 1 puff by inhalation route once daily at the same time each day for 30 days         Allergy List  Allergen Name Date Reaction Notes   NO KNOWN DRUG ALLERGIES --  --  --          Social History  Finding Status Start/Stop Quantity Notes   Tobacco Former --/51 --  --          Immunizations  NameDate Admin Mfg Trade Name Lot Number Route Inj VIS Given VIS Publication   HepA01/08/2019 SKB HAVRIX-ADULT [id number] IM LA 01/08/2019 07/20/2016   Comments: [id number]         Review of Systems  · Constitutional  o Denies  o : fatigue, fever  · Cardiovascular  o Admits  o : swelling (feet, ankles, hands)  o Denies  o : chest pain, syncope, palpitations  · Respiratory  o Denies  o : shortness of breath, cough  · Gastrointestinal  o Denies  o : nausea, vomiting, diarrhea      Vitals  Date Time BP Position Site L\R Cuff Size HR RR TEMP (F) WT  HT  BMI kg/m2 BSA m2 O2 Sat FR L/min FiO2 HC       05/11/2021 03:37 /70 Sitting    78 - R  98.5 184lbs 0oz    96 %            Physical Examination  · Constitutional  o Appearance  o : well developed, well-nourished, in no acute distress  · Respiratory  o Respiratory Effort  o : breathing unlabored  o Auscultation of Lungs  o : clear to ascultation  · Cardiovascular  o Heart  o :   § Auscultation of Heart  § : regular rate and rhythm  o Peripheral Vascular System  o :   § Pedal Pulses  § : bilateral pulse strength 2+, right pulse strength, left pulse strength 2+  § Extremities  § : only slight edema in both lower extremities up to ankles- nonpitting and slight only today, no redness or warmth  · Musculoskeletal  o General  o :   § General Musculoskeletal  § : No joint swelling or deformity., Muscle tone, strength, and development grossly  normal.  · Neurologic  o Gait and Station  o :   § Gait Screening  § : normal gait  · Psychiatric  o Mood and Affect  o : mood normal, affect appropriate          Assessment  · Edema     782.3/R60.9      Plan  · Orders  o ACO-39: Current medications updated and reviewed (1159F, ) - - 05/11/2021  · Medications  o Medications have been Reconciled  o Transition of Care or Provider Policy  · Instructions  o Patient was educated/instructed on their diagnosis, treatment and medications prior to discharge from the clinic today.            Electronically Signed by: Desmond Lloyd MD -Author on May 11, 2021 03:58:57 PM

## 2021-06-14 RX ORDER — HYDROCHLOROTHIAZIDE 12.5 MG/1
TABLET ORAL
Qty: 30 TABLET | Refills: 2 | Status: SHIPPED | OUTPATIENT
Start: 2021-06-14 | End: 2021-06-28

## 2021-06-28 ENCOUNTER — OFFICE VISIT (OUTPATIENT)
Dept: FAMILY MEDICINE CLINIC | Facility: CLINIC | Age: 56
End: 2021-06-28

## 2021-06-28 VITALS
HEART RATE: 86 BPM | DIASTOLIC BLOOD PRESSURE: 60 MMHG | HEIGHT: 65 IN | BODY MASS INDEX: 30.32 KG/M2 | TEMPERATURE: 98.2 F | OXYGEN SATURATION: 95 % | SYSTOLIC BLOOD PRESSURE: 121 MMHG | WEIGHT: 182 LBS

## 2021-06-28 DIAGNOSIS — M79.10 MUSCLE PAIN: ICD-10-CM

## 2021-06-28 DIAGNOSIS — R60.9 EDEMA, UNSPECIFIED TYPE: Primary | ICD-10-CM

## 2021-06-28 DIAGNOSIS — R25.2 MUSCLE CRAMPS: ICD-10-CM

## 2021-06-28 LAB
ALBUMIN SERPL-MCNC: 4.1 G/DL (ref 3.5–5.2)
ALBUMIN/GLOB SERPL: 1.5 G/DL
ALP SERPL-CCNC: 72 U/L (ref 39–117)
ALT SERPL W P-5'-P-CCNC: 14 U/L (ref 1–41)
ANION GAP SERPL CALCULATED.3IONS-SCNC: 8.1 MMOL/L (ref 5–15)
AST SERPL-CCNC: 15 U/L (ref 1–40)
BASOPHILS # BLD AUTO: 0.03 10*3/MM3 (ref 0–0.2)
BASOPHILS NFR BLD AUTO: 0.5 % (ref 0–1.5)
BILIRUB SERPL-MCNC: 0.6 MG/DL (ref 0–1.2)
BUN SERPL-MCNC: 12 MG/DL (ref 6–20)
BUN/CREAT SERPL: 13 (ref 7–25)
CALCIUM SPEC-SCNC: 9.5 MG/DL (ref 8.6–10.5)
CHLORIDE SERPL-SCNC: 98 MMOL/L (ref 98–107)
CO2 SERPL-SCNC: 32.9 MMOL/L (ref 22–29)
CREAT SERPL-MCNC: 0.92 MG/DL (ref 0.76–1.27)
DEPRECATED RDW RBC AUTO: 45.5 FL (ref 37–54)
EOSINOPHIL # BLD AUTO: 0.13 10*3/MM3 (ref 0–0.4)
EOSINOPHIL NFR BLD AUTO: 2.2 % (ref 0.3–6.2)
ERYTHROCYTE [DISTWIDTH] IN BLOOD BY AUTOMATED COUNT: 12.7 % (ref 12.3–15.4)
FOLATE SERPL-MCNC: >20 NG/ML (ref 4.78–24.2)
GFR SERPL CREATININE-BSD FRML MDRD: 85 ML/MIN/1.73
GLOBULIN UR ELPH-MCNC: 2.8 GM/DL
GLUCOSE SERPL-MCNC: 92 MG/DL (ref 65–99)
HCT VFR BLD AUTO: 44.8 % (ref 37.5–51)
HGB BLD-MCNC: 14.9 G/DL (ref 13–17.7)
IMM GRANULOCYTES # BLD AUTO: 0.02 10*3/MM3 (ref 0–0.05)
IMM GRANULOCYTES NFR BLD AUTO: 0.3 % (ref 0–0.5)
IRON 24H UR-MRATE: 69 MCG/DL (ref 59–158)
IRON SATN MFR SERPL: 18 % (ref 20–50)
LYMPHOCYTES # BLD AUTO: 1.52 10*3/MM3 (ref 0.7–3.1)
LYMPHOCYTES NFR BLD AUTO: 25.5 % (ref 19.6–45.3)
MAGNESIUM SERPL-MCNC: 1.8 MG/DL (ref 1.6–2.6)
MCH RBC QN AUTO: 32.1 PG (ref 26.6–33)
MCHC RBC AUTO-ENTMCNC: 33.3 G/DL (ref 31.5–35.7)
MCV RBC AUTO: 96.6 FL (ref 79–97)
MONOCYTES # BLD AUTO: 0.45 10*3/MM3 (ref 0.1–0.9)
MONOCYTES NFR BLD AUTO: 7.5 % (ref 5–12)
NEUTROPHILS NFR BLD AUTO: 3.82 10*3/MM3 (ref 1.7–7)
NEUTROPHILS NFR BLD AUTO: 64 % (ref 42.7–76)
NRBC BLD AUTO-RTO: 0 /100 WBC (ref 0–0.2)
PLATELET # BLD AUTO: 286 10*3/MM3 (ref 140–450)
PMV BLD AUTO: 10.4 FL (ref 6–12)
POTASSIUM SERPL-SCNC: 4.2 MMOL/L (ref 3.5–5.2)
PROT SERPL-MCNC: 6.9 G/DL (ref 6–8.5)
RBC # BLD AUTO: 4.64 10*6/MM3 (ref 4.14–5.8)
SODIUM SERPL-SCNC: 139 MMOL/L (ref 136–145)
TIBC SERPL-MCNC: 377 MCG/DL (ref 298–536)
TRANSFERRIN SERPL-MCNC: 253 MG/DL (ref 200–360)
VIT B12 BLD-MCNC: >2000 PG/ML (ref 211–946)
WBC # BLD AUTO: 5.97 10*3/MM3 (ref 3.4–10.8)

## 2021-06-28 PROCEDURE — 80053 COMPREHEN METABOLIC PANEL: CPT | Performed by: FAMILY MEDICINE

## 2021-06-28 PROCEDURE — 82746 ASSAY OF FOLIC ACID SERUM: CPT | Performed by: FAMILY MEDICINE

## 2021-06-28 PROCEDURE — 82607 VITAMIN B-12: CPT | Performed by: FAMILY MEDICINE

## 2021-06-28 PROCEDURE — 84466 ASSAY OF TRANSFERRIN: CPT | Performed by: FAMILY MEDICINE

## 2021-06-28 PROCEDURE — 85025 COMPLETE CBC W/AUTO DIFF WBC: CPT | Performed by: FAMILY MEDICINE

## 2021-06-28 PROCEDURE — 99214 OFFICE O/P EST MOD 30 MIN: CPT | Performed by: FAMILY MEDICINE

## 2021-06-28 PROCEDURE — 83540 ASSAY OF IRON: CPT | Performed by: FAMILY MEDICINE

## 2021-06-28 PROCEDURE — 83735 ASSAY OF MAGNESIUM: CPT | Performed by: FAMILY MEDICINE

## 2021-06-28 RX ORDER — VITAMIN E 268 MG
CAPSULE ORAL
COMMUNITY
End: 2022-05-19

## 2021-06-28 RX ORDER — PSEUDOEPHEDRINE HYDROCHLORIDE 60 MG/1
TABLET, FILM COATED ORAL
COMMUNITY
End: 2022-05-19

## 2021-06-28 RX ORDER — ALBUTEROL SULFATE 2.5 MG/3ML
SOLUTION RESPIRATORY (INHALATION)
COMMUNITY
Start: 2021-06-15

## 2021-06-28 RX ORDER — PERPHENAZINE 2 MG
TABLET ORAL
COMMUNITY
End: 2022-05-19

## 2021-06-28 RX ORDER — POTASSIUM CHLORIDE 750 MG/1
CAPSULE, EXTENDED RELEASE ORAL
COMMUNITY
End: 2022-05-19

## 2021-06-28 RX ORDER — HYDROCHLOROTHIAZIDE 25 MG/1
25 TABLET ORAL DAILY
Qty: 30 TABLET | Refills: 2 | Status: SHIPPED | OUTPATIENT
Start: 2021-06-28 | End: 2021-10-26

## 2021-06-28 RX ORDER — ESCITALOPRAM OXALATE 10 MG/1
TABLET ORAL
COMMUNITY
Start: 2021-06-16 | End: 2021-08-18

## 2021-06-28 RX ORDER — MULTIPLE VITAMINS W/ MINERALS TAB 9MG-400MCG
TAB ORAL NIGHTLY
COMMUNITY

## 2021-06-28 RX ORDER — IBUPROFEN 200 MG
CAPSULE ORAL
COMMUNITY
End: 2022-05-19

## 2021-06-28 RX ORDER — TAMSULOSIN HYDROCHLORIDE 0.4 MG/1
CAPSULE ORAL
COMMUNITY
Start: 2021-06-16 | End: 2021-08-27

## 2021-06-28 NOTE — PROGRESS NOTES
"Chief Complaint  Leg Swelling (Patient would like labs done for swelling)    Subjective          Avelino Lowry presents to [facility]  Pt has seen cardiology for leg swelling and they said that pt has no heart related causes, pt has had negative U/S of legs for DVT      Leg Swelling  This is a chronic problem. Episode onset: 4 months. The problem occurs constantly. The problem has been gradually improving. Associated symptoms include myalgias. Pertinent negatives include no abdominal pain, anorexia, arthralgias, change in bowel habit, chest pain, chills, congestion, coughing, diaphoresis, fatigue, fever, headaches, joint swelling, nausea, neck pain, numbness, rash, sore throat, swollen glands, urinary symptoms, vertigo, visual change, vomiting or weakness. Nothing aggravates the symptoms. Treatments tried: jobst stockins. The treatment provided mild relief.   Muscle Pain  This is a new problem. The current episode started 1 to 4 weeks ago (occurs only mainly at night). The problem occurs intermittently. The problem is unchanged. Pain location: right and left lower legs and hands. The pain is mild. Nothing aggravates the symptoms. Pertinent negatives include no abdominal pain, chest pain, constipation, diarrhea, dysuria, fatigue, fever, headaches, joint swelling, nausea, rash, stiffness, sensory change, shortness of breath, swollen glands, urinary symptoms, visual change, vomiting, weakness or wheezing. Past treatments include nothing. Swelling location: mild swelling of both lower legs.       Objective   No Known Allergies  Immunization History   Administered Date(s) Administered   • Hepatitis A 01/08/2019       Vital Signs:   Vitals:    06/28/21 0940   BP: 121/60   Pulse: 86   Temp: 98.2 °F (36.8 °C)   SpO2: 95%   Weight: 82.6 kg (182 lb)   Height: 165.1 cm (65\")       Physical Exam  Vitals reviewed.   Constitutional:       Appearance: Normal appearance. He is well-developed. "   HENT:      Head: Normocephalic and atraumatic.      Right Ear: External ear normal.      Left Ear: External ear normal.      Mouth/Throat:      Pharynx: No oropharyngeal exudate.   Eyes:      Conjunctiva/sclera: Conjunctivae normal.      Pupils: Pupils are equal, round, and reactive to light.   Cardiovascular:      Rate and Rhythm: Normal rate and regular rhythm.      Pulses: Normal pulses.      Heart sounds: Normal heart sounds. No murmur heard.   No friction rub. No gallop.       Comments: 1+ pitting edema in both lower legs up to calves, no redness, warmth, or tenderness.  Pulmonary:      Effort: Pulmonary effort is normal.      Breath sounds: Normal breath sounds. No wheezing or rhonchi.   Abdominal:      General: Bowel sounds are normal. There is no distension.      Palpations: Abdomen is soft.      Tenderness: There is no abdominal tenderness.   Skin:     General: Skin is warm and dry.      Capillary Refill: Capillary refill takes less than 2 seconds.   Neurological:      Mental Status: He is alert and oriented to person, place, and time.      Cranial Nerves: No cranial nerve deficit.   Psychiatric:         Mood and Affect: Mood and affect normal.         Behavior: Behavior normal.         Thought Content: Thought content normal.         Judgment: Judgment normal.        Result Review :   The following data was reviewed by: Desmond Lloyd MD on 06/28/2021:  CMP    CMP 11/4/20 3/4/21   Glucose 95 91   BUN 14 12   Creatinine 1.08 0.95   Sodium 139 139   Potassium 4.1 3.7   Chloride 97 (A) 96 (A)   Calcium 9.2 9.9   Albumin 4.5 4.2   Total Bilirubin 0.75 0.90   Alkaline Phosphatase 99 84   AST (SGOT) 28 21   ALT (SGPT) 21 16   (A) Abnormal value            CBC    CBC 11/4/20 3/4/21   WBC 7.61 6.73   RBC 4.85 4.58 (A)   Hemoglobin 15.0 14.2   Hematocrit 45.9 44.2   MCV 94.6 96.5 (A)   MCH 30.9 31.0   MCHC 32.7 (A) 32.1 (A)   RDW 13.4 13.5   Platelets 306 279   (A) Abnormal value            TSH    TSH 3/4/21    TSH 1.810                     Assessment and Plan    Diagnoses and all orders for this visit:    1. Edema, unspecified type (Primary)  -     CBC Auto Differential  -     Comprehensive Metabolic Panel  -     Iron Profile  -     Ambulatory Referral to Vascular Surgery  -     hydroCHLOROthiazide (HYDRODIURIL) 25 MG tablet; Take 1 tablet by mouth Daily.  Dispense: 30 tablet; Refill: 2    2. Muscle cramps  -     CBC Auto Differential  -     Comprehensive Metabolic Panel  -     Magnesium  -     Iron Profile  -     Vitamin B12 & Folate  -     CK; Future    3. Muscle pain  -     CBC Auto Differential  -     Comprehensive Metabolic Panel  -     Magnesium  -     Iron Profile  -     Vitamin B12 & Folate  -     CK; Future            Follow Up   No follow-ups on file.  Patient was given instructions and counseling regarding his condition or for health maintenance advice. Please see specific information pulled into the AVS if appropriate.

## 2021-06-28 NOTE — PROGRESS NOTES
Venipuncture Blood Specimen Collection  Venipuncture performed in LEFT ARM by Jeanna Rodriguez with good hemostasis. Patient tolerated the procedure well without complications.   06/28/21   Jeanna Rodriguez

## 2021-07-15 VITALS
WEIGHT: 184 LBS | OXYGEN SATURATION: 96 % | BODY MASS INDEX: 31.58 KG/M2 | SYSTOLIC BLOOD PRESSURE: 120 MMHG | DIASTOLIC BLOOD PRESSURE: 70 MMHG | HEART RATE: 78 BPM | TEMPERATURE: 98.5 F

## 2021-08-19 RX ORDER — ESCITALOPRAM OXALATE 10 MG/1
10 TABLET ORAL DAILY
Qty: 30 TABLET | Refills: 2 | Status: SHIPPED | OUTPATIENT
Start: 2021-08-19 | End: 2021-11-22

## 2021-08-27 RX ORDER — TAMSULOSIN HYDROCHLORIDE 0.4 MG/1
CAPSULE ORAL
Qty: 30 CAPSULE | Refills: 2 | Status: SHIPPED | OUTPATIENT
Start: 2021-08-27 | End: 2021-11-22

## 2021-10-26 DIAGNOSIS — R60.9 EDEMA, UNSPECIFIED TYPE: ICD-10-CM

## 2021-10-26 RX ORDER — HYDROCHLOROTHIAZIDE 25 MG/1
TABLET ORAL
Qty: 30 TABLET | Refills: 2 | Status: SHIPPED | OUTPATIENT
Start: 2021-10-26 | End: 2022-01-20

## 2021-11-22 RX ORDER — TAMSULOSIN HYDROCHLORIDE 0.4 MG/1
CAPSULE ORAL
Qty: 30 CAPSULE | Refills: 2 | Status: SHIPPED | OUTPATIENT
Start: 2021-11-22 | End: 2022-02-18

## 2021-11-22 RX ORDER — ESCITALOPRAM OXALATE 10 MG/1
TABLET ORAL
Qty: 30 TABLET | Refills: 2 | Status: SHIPPED | OUTPATIENT
Start: 2021-11-22 | End: 2022-02-18

## 2022-01-14 ENCOUNTER — OFFICE VISIT (OUTPATIENT)
Dept: FAMILY MEDICINE CLINIC | Facility: CLINIC | Age: 57
End: 2022-01-14

## 2022-01-14 VITALS
OXYGEN SATURATION: 93 % | HEART RATE: 100 BPM | DIASTOLIC BLOOD PRESSURE: 74 MMHG | SYSTOLIC BLOOD PRESSURE: 126 MMHG | TEMPERATURE: 97.7 F | WEIGHT: 181 LBS | BODY MASS INDEX: 30.12 KG/M2

## 2022-01-14 DIAGNOSIS — M70.22 OLECRANON BURSITIS OF BOTH ELBOWS: ICD-10-CM

## 2022-01-14 DIAGNOSIS — M70.21 OLECRANON BURSITIS OF BOTH ELBOWS: ICD-10-CM

## 2022-01-14 DIAGNOSIS — M25.429 ELBOW SWELLING, UNSPECIFIED LATERALITY: Primary | ICD-10-CM

## 2022-01-14 PROBLEM — J44.9 CHRONIC OBSTRUCTIVE PULMONARY DISEASE: Status: ACTIVE | Noted: 2021-03-19

## 2022-01-14 PROCEDURE — 99213 OFFICE O/P EST LOW 20 MIN: CPT | Performed by: FAMILY MEDICINE

## 2022-01-14 RX ORDER — PREDNISONE 20 MG/1
40 TABLET ORAL DAILY
Qty: 10 TABLET | Refills: 0 | Status: SHIPPED | OUTPATIENT
Start: 2022-01-14 | End: 2022-01-19

## 2022-01-14 NOTE — PROGRESS NOTES
Chief Complaint  Fluid Retention (left elbow x2-3 weeks - thinks right elbow is beginning )    Subjective          Avelino Lowry presents to [facility]  Left and right elbow swelling in area of olecranon bursa x 3 weeks- gradual onset    Joint Swelling  This is a new problem. Episode onset: 3 weeks. The problem occurs constantly. The problem has been gradually worsening. Associated symptoms include joint swelling. Pertinent negatives include no abdominal pain, anorexia, arthralgias, change in bowel habit, chest pain, chills, congestion, coughing, diaphoresis, fatigue, fever, headaches, myalgias, nausea, neck pain, numbness, rash, sore throat, swollen glands, urinary symptoms, vertigo, visual change, vomiting or weakness. Nothing aggravates the symptoms. He has tried nothing for the symptoms.       Objective   No Known Allergies  Immunization History   Administered Date(s) Administered   • Hepatitis A 01/08/2019     Past Medical History:   Diagnosis Date   • COPD (chronic obstructive pulmonary disease) (HCC)       History reviewed. No pertinent surgical history.   Social History     Socioeconomic History   • Marital status:    Tobacco Use   • Smoking status: Former Smoker   • Smokeless tobacco: Never Used   • Tobacco comment: stopped at age 52   Vaping Use   • Vaping Use: Never used        Current Outpatient Medications:   •  albuterol (PROVENTIL) (2.5 MG/3ML) 0.083% nebulizer solution, USE ONE VIAL VIA NEBULIZER EVERY 4 TO 6 HOURS AS NEEDED, Disp: , Rfl:   •  calcium citrate (CALCITRATE) 950 (200 Ca) MG tablet, calcium citrate 200 mg (950 mg) oral tablet take 1 tablet by oral route daily   Active, Disp: , Rfl:   •  escitalopram (LEXAPRO) 10 MG tablet, TAKE ONE TABLET BY MOUTH EVERY DAY FOR ANXIETY, Disp: 30 tablet, Rfl: 2  •  ipratropium (ATROVENT) 0.02 % nebulizer solution, 1.25 mL., Disp: , Rfl:   •  multivitamin with minerals (MULTIVITAMIN ADULT PO), multivitamin oral  tablet take 1 tablet by oral route daily   Active, Disp: , Rfl:   •  Omega 3-6-9 capsule, Omega 3-6-9 1,200 mg oral capsule take 1 capsule by oral route daily   Active, Disp: , Rfl:   •  potassium chloride (MICRO-K) 10 MEQ CR capsule, potassium chloride 10 mEq oral capsule, extended release take 1 capsule (10 meq) by oral route once daily with food   Active, Disp: , Rfl:   •  ProAir  (90 Base) MCG/ACT inhaler, Inhale See Admin Instructions. Inhale 1-2 puffs by mouth every 4 to 6 hours as needed, Disp: , Rfl:   •  pseudoephedrine (SudoGest) 60 MG tablet, Sudogest 60 mg oral tablet take 1 tablet (60 mg) by oral route every 6 hours as needed   Active, Disp: , Rfl:   •  tamsulosin (FLOMAX) 0.4 MG capsule 24 hr capsule, TAKE ONE CAPSULE BY MOUTH EVERY DAY 30 minutes following THE same meal each DAY, Disp: 30 capsule, Rfl: 2  •  Trelegy Ellipta 200-62.5-25 MCG/INH inhaler, Inhale 1 puff Daily., Disp: , Rfl:   •  vitamin E 400 UNIT capsule, vitamin E 400 unit oral capsule take 1 capsule by oral route daily   Active, Disp: , Rfl:   •  Cobalamin Combinations (VITAMIN B12-FOLIC ACID PO), vitamin B12-folic acid 500-400 mcg oral tablet take 2 tablets by oral route daily   Active, Disp: , Rfl:   •  hydroCHLOROthiazide (HYDRODIURIL) 25 MG tablet, TAKE ONE TABLET BY MOUTH EVERY DAY, Disp: 30 tablet, Rfl: 2  •  Potassium 99 MG tablet, Take 1 tablet by mouth Daily., Disp: , Rfl:   •  predniSONE (DELTASONE) 20 MG tablet, Take 2 tablets by mouth Daily for 5 days., Disp: 10 tablet, Rfl: 0   History reviewed. No pertinent family history.       Vital Signs:   Vitals:    01/14/22 1045   BP: 126/74   Pulse: 100   Temp: 97.7 °F (36.5 °C)   SpO2: 93%   Weight: 82.1 kg (181 lb)       Physical Exam  Vitals reviewed.   Constitutional:       Appearance: Normal appearance. He is well-developed.   HENT:      Head: Normocephalic and atraumatic.      Right Ear: External ear normal.      Left Ear: External ear normal.      Mouth/Throat:       Pharynx: No oropharyngeal exudate.   Eyes:      Conjunctiva/sclera: Conjunctivae normal.      Pupils: Pupils are equal, round, and reactive to light.   Cardiovascular:      Rate and Rhythm: Normal rate and regular rhythm.      Pulses: Normal pulses.      Heart sounds: Normal heart sounds. No murmur heard.  No friction rub. No gallop.    Pulmonary:      Effort: Pulmonary effort is normal.      Breath sounds: Normal breath sounds. No wheezing or rhonchi.   Abdominal:      General: Abdomen is flat. Bowel sounds are normal. There is no distension.      Palpations: Abdomen is soft. There is no mass.      Tenderness: There is no abdominal tenderness. There is no guarding or rebound.      Hernia: No hernia is present.   Musculoskeletal:         General: Normal range of motion.      Comments: Bilateral elbow swelling in area of right and left olecranon bursa- normal ROM, stable, no redness, bruising, or warmth.   Skin:     General: Skin is warm and dry.      Capillary Refill: Capillary refill takes less than 2 seconds.   Neurological:      General: No focal deficit present.      Mental Status: He is alert and oriented to person, place, and time.      Cranial Nerves: No cranial nerve deficit.   Psychiatric:         Mood and Affect: Mood and affect normal.         Behavior: Behavior normal.         Thought Content: Thought content normal.         Judgment: Judgment normal.        Result Review :   The following data was reviewed by: Desmond Lloyd MD on 01/14/2022:    Data reviewed: Radiologic studies I viewed and interpreted 3 views both elbows x-rays:no fxs.          Assessment and Plan    Diagnoses and all orders for this visit:    1. Elbow swelling, unspecified laterality (Primary)  -     XR Elbow 2 View Bilateral (In Office)  -     Ambulatory Referral to Orthopedic Surgery    2. Olecranon bursitis of both elbows  -     Ambulatory Referral to Orthopedic Surgery  -     predniSONE (DELTASONE) 20 MG tablet; Take 2 tablets  by mouth Daily for 5 days.  Dispense: 10 tablet; Refill: 0            Follow Up   Return if symptoms worsen or fail to improve.  Patient was given instructions and counseling regarding his condition or for health maintenance advice. Please see specific information pulled into the AVS if appropriate.

## 2022-01-18 ENCOUNTER — OFFICE VISIT (OUTPATIENT)
Dept: ORTHOPEDIC SURGERY | Facility: CLINIC | Age: 57
End: 2022-01-18

## 2022-01-18 VITALS — WEIGHT: 181 LBS | HEART RATE: 102 BPM | BODY MASS INDEX: 30.16 KG/M2 | HEIGHT: 65 IN | OXYGEN SATURATION: 94 %

## 2022-01-18 DIAGNOSIS — M25.521 RIGHT ELBOW PAIN: ICD-10-CM

## 2022-01-18 DIAGNOSIS — M70.22 OLECRANON BURSITIS OF LEFT ELBOW: Primary | ICD-10-CM

## 2022-01-18 PROCEDURE — 99203 OFFICE O/P NEW LOW 30 MIN: CPT | Performed by: ORTHOPAEDIC SURGERY

## 2022-01-18 RX ORDER — DICLOFENAC SODIUM 75 MG/1
75 TABLET, DELAYED RELEASE ORAL 2 TIMES DAILY
Qty: 60 TABLET | Refills: 0 | Status: SHIPPED | OUTPATIENT
Start: 2022-01-18 | End: 2022-02-18 | Stop reason: SDUPTHER

## 2022-01-18 NOTE — PROGRESS NOTES
"Chief Complaint  Pain of the Left Elbow and Pain of the Right Elbow     Subjective      Avelino Lowry presents to [facility] for an evaluation of bilateral elbows. Patient states 3 weeks ago he started having a lot of swelling in the left elbow. He states he plays a lot of phone games with his elbow rested on the counter. He saw his PCP who placed him on a steroid. Steroid pack has given him relief from swelling of the left elbow.     No Known Allergies     Social History     Socioeconomic History   • Marital status:    Tobacco Use   • Smoking status: Former Smoker   • Smokeless tobacco: Never Used   • Tobacco comment: stopped at age 52   Vaping Use   • Vaping Use: Never used        Review of Systems     Objective   Vital Signs:   Pulse 102   Ht 165.1 cm (65\")   Wt 82.1 kg (181 lb)   SpO2 94%   BMI 30.12 kg/m²       Physical Exam  Constitutional:       Appearance: Normal appearance. Patient is well-developed and normal weight.   HENT:      Head: Normocephalic.      Right Ear: Hearing and external ear normal.      Left Ear: Hearing and external ear normal.      Nose: Nose normal.   Eyes:      Conjunctiva/sclera: Conjunctivae normal.   Cardiovascular:      Rate and Rhythm: Normal rate.   Pulmonary:      Effort: Pulmonary effort is normal.      Breath sounds: No wheezing or rales.   Abdominal:      Palpations: Abdomen is soft.      Tenderness: There is no abdominal tenderness.   Musculoskeletal:      Cervical back: Normal range of motion.   Skin:     Findings: No rash.   Neurological:      Mental Status: Patient is alert and oriented to person, place, and time.   Psychiatric:         Mood and Affect: Mood and affect normal.         Judgment: Judgment normal.       Ortho Exam      LEFT ELBOW: Small olecranon bursitis. No signs of infection. Good tone of deltoid, biceps, triceps, wrist extensors, and wrist flexors.  Sensation grossly intact. Neurovascular intact.  Radial pulse " 2+, ulnar pulse 2+. Full elbow flexion and extension.     RIGHT ELBOW: Good tone of deltoid, biceps, triceps, wrist extensors, and wrist flexors.  Sensation grossly intact. Neurovascular intact.  Radial pulse 2+, ulnar pulse 2+. No swelling, skin discoloration or atrophy. Full elbow flexion and extension.       Procedures      Imaging Results (Most Recent)     None           Result Review :         XR Elbow 2 View Bilateral (In Office)    Result Date: 1/14/2022  Narrative: PROCEDURE: XR ELBOW 2 VW BILATERAL  COMPARISON: None  INDICATIONS: bilateral elbow swelling x 2 weeks- no known injury  FINDINGS:  No fracture or malalignment is demonstrated.  Mild osteoarthritic spurring is noted along the capitellum.  Moderate soft tissue swelling is noted posterior to the olecranon      Impression:   1. Soft tissue swelling posterior to the olecranon may represent posttraumatic changes, gout or infection 2. Mild osteoarthritis      Keenan Jordan M.D.       Electronically Signed and Approved By: Keenan Jordan M.D. on 1/14/2022 at 11:29                      Assessment and Plan     DX: Right elbow pain  Left elbow olecranon bursitis     Discussed treatment plans and diagnosis with the patient. Patient to finish out his steroid pack. He is prescribed an anti-inflammatory to take afterwards. Avoid leaning with elbows when playing games.     Call or return if worsening symptoms.    Follow Up     PRN.       Patient was given instructions and counseling regarding his condition or for health maintenance advice. Please see specific information pulled into the AVS if appropriate.     Scribed for Rajni Yoder MD by Clarissa Dawson.  01/18/22   14:35 EST        I have personally performed the services described in this document as scribed by the above individual and it is both accurate and complete. Rajni Yoder MD 01/19/22

## 2022-01-20 DIAGNOSIS — R60.9 EDEMA, UNSPECIFIED TYPE: ICD-10-CM

## 2022-01-20 RX ORDER — HYDROCHLOROTHIAZIDE 25 MG/1
TABLET ORAL
Qty: 30 TABLET | Refills: 2 | Status: SHIPPED | OUTPATIENT
Start: 2022-01-20 | End: 2022-03-24 | Stop reason: SDUPTHER

## 2022-02-14 ENCOUNTER — TELEPHONE (OUTPATIENT)
Dept: UROLOGY | Facility: CLINIC | Age: 57
End: 2022-02-14

## 2022-02-17 DIAGNOSIS — M70.22 OLECRANON BURSITIS OF LEFT ELBOW: Primary | ICD-10-CM

## 2022-02-17 RX ORDER — NABUMETONE 750 MG/1
750 TABLET, FILM COATED ORAL 2 TIMES DAILY
Qty: 60 TABLET | Refills: 1 | Status: SHIPPED | OUTPATIENT
Start: 2022-02-17 | End: 2022-05-19

## 2022-02-17 NOTE — TELEPHONE ENCOUNTER
3RD ATTEMPT TO CALL PT TO RESCHEDULE FROM NO SHOW APPT ON 2/11/22, NO ANSWER/LMOM. PLEASE ADVISE IF ANYTHING FURTHER TO DO

## 2022-02-18 RX ORDER — DICLOFENAC SODIUM 75 MG/1
75 TABLET, DELAYED RELEASE ORAL 2 TIMES DAILY
Qty: 60 TABLET | Refills: 0 | Status: SHIPPED | OUTPATIENT
Start: 2022-02-18 | End: 2022-05-19

## 2022-02-18 RX ORDER — ESCITALOPRAM OXALATE 10 MG/1
TABLET ORAL
Qty: 30 TABLET | Refills: 2 | Status: SHIPPED | OUTPATIENT
Start: 2022-02-18 | End: 2022-03-24 | Stop reason: SDUPTHER

## 2022-02-18 RX ORDER — TAMSULOSIN HYDROCHLORIDE 0.4 MG/1
CAPSULE ORAL
Qty: 30 CAPSULE | Refills: 2 | Status: SHIPPED | OUTPATIENT
Start: 2022-02-18 | End: 2022-03-24 | Stop reason: SDUPTHER

## 2022-03-03 ENCOUNTER — OFFICE VISIT (OUTPATIENT)
Dept: ORTHOPEDIC SURGERY | Facility: CLINIC | Age: 57
End: 2022-03-03

## 2022-03-03 VITALS — BODY MASS INDEX: 30.16 KG/M2 | HEIGHT: 65 IN | WEIGHT: 181 LBS

## 2022-03-03 DIAGNOSIS — M70.22 OLECRANON BURSITIS OF LEFT ELBOW: Primary | ICD-10-CM

## 2022-03-03 PROCEDURE — 20551 NJX 1 TENDON ORIGIN/INSJ: CPT | Performed by: ORTHOPAEDIC SURGERY

## 2022-03-03 RX ORDER — BUPIVACAINE HYDROCHLORIDE 2.5 MG/ML
1 INJECTION, SOLUTION INFILTRATION; PERINEURAL
Status: COMPLETED | OUTPATIENT
Start: 2022-03-03 | End: 2022-03-03

## 2022-03-03 RX ORDER — TRIAMCINOLONE ACETONIDE 40 MG/ML
40 INJECTION, SUSPENSION INTRA-ARTICULAR; INTRAMUSCULAR
Status: COMPLETED | OUTPATIENT
Start: 2022-03-03 | End: 2022-03-03

## 2022-03-03 RX ADMIN — BUPIVACAINE HYDROCHLORIDE 1 ML: 2.5 INJECTION, SOLUTION INFILTRATION; PERINEURAL at 13:20

## 2022-03-03 RX ADMIN — TRIAMCINOLONE ACETONIDE 40 MG: 40 INJECTION, SUSPENSION INTRA-ARTICULAR; INTRAMUSCULAR at 13:20

## 2022-03-03 NOTE — PROGRESS NOTES
"Chief Complaint  Follow-up of the Left Elbow     Subjective      Avelino Lowry presents to [facility] for a follow-up of left elbow. Patient has an olecranon bursitis on the left elbow. He had a steroid pack that he finished out. He was prescribed Voltaren to take orally after steroid pack. However, the olecranon bursitis has gotten larger.      No Known Allergies     Social History     Socioeconomic History   • Marital status:    Tobacco Use   • Smoking status: Former Smoker   • Smokeless tobacco: Never Used   • Tobacco comment: stopped at age 52   Vaping Use   • Vaping Use: Never used        Review of Systems     Objective   Vital Signs:   Ht 165.1 cm (65\")   Wt 82.1 kg (181 lb)   BMI 30.12 kg/m²       Physical Exam  Constitutional:       Appearance: Normal appearance. Patient is well-developed and normal weight.   HENT:      Head: Normocephalic.      Right Ear: Hearing and external ear normal.      Left Ear: Hearing and external ear normal.      Nose: Nose normal.   Eyes:      Conjunctiva/sclera: Conjunctivae normal.   Cardiovascular:      Rate and Rhythm: Normal rate.   Pulmonary:      Effort: Pulmonary effort is normal.      Breath sounds: No wheezing or rales.   Abdominal:      Palpations: Abdomen is soft.      Tenderness: There is no abdominal tenderness.   Musculoskeletal:      Cervical back: Normal range of motion.   Skin:     Findings: No rash.   Neurological:      Mental Status: Patient is alert and oriented to person, place, and time.   Psychiatric:         Mood and Affect: Mood and affect normal.         Judgment: Judgment normal.       Ortho Exam      LEFT ELBOW: Olecranon bursitis present, larger in size compared to previous visit. Radial pulse 2+, ulnar pulse 2+. Full elbow flexion and extension. Mildly tender to palpation. No signs of infection.       Small Joint Arthrocentesis  Consent given by: patient  Site marked: site marked  Timeout: Immediately prior " to procedure a time out was called to verify the correct patient, procedure, equipment, support staff and site/side marked as required   Procedure Details  Preparation: Patient was prepped and draped in the usual sterile fashion  Medications administered: 1 mL bupivacaine 0.25 %; 40 mg triamcinolone acetonide 40 MG/ML  Aspirate amount: 20 mL  Aspirate: clear and yellow  Patient tolerance: patient tolerated the procedure well with no immediate complications            Imaging Results (Most Recent)     None           Result Review :         No results found.           Assessment and Plan     DX: Left elbow olecranon bursitis     Discussed treatment plans with the patient. Patient opted to have olecranon bursitis aspirated. He tolerated this procedure well.     13 cc aspirated from the elbow.     Call or return if worsening symptoms.    Follow Up     2-3 weeks.       Patient was given instructions and counseling regarding his condition or for health maintenance advice. Please see specific information pulled into the AVS if appropriate.     Scribed for Rajni Yoder MD by Clarissa Dawson.  03/03/22   13:01 EST        I have personally performed the services described in this document as scribed by the above individual and it is both accurate and complete. Rajni Yoder MD 03/03/22

## 2022-03-10 ENCOUNTER — OFFICE VISIT (OUTPATIENT)
Dept: FAMILY MEDICINE CLINIC | Facility: CLINIC | Age: 57
End: 2022-03-10

## 2022-03-10 VITALS
WEIGHT: 174 LBS | HEART RATE: 85 BPM | TEMPERATURE: 98.2 F | BODY MASS INDEX: 28.99 KG/M2 | DIASTOLIC BLOOD PRESSURE: 78 MMHG | OXYGEN SATURATION: 95 % | HEIGHT: 65 IN | SYSTOLIC BLOOD PRESSURE: 142 MMHG

## 2022-03-10 DIAGNOSIS — Z12.5 SCREENING PSA (PROSTATE SPECIFIC ANTIGEN): ICD-10-CM

## 2022-03-10 DIAGNOSIS — Z00.00 MEDICARE WELCOME EXAM: Primary | ICD-10-CM

## 2022-03-10 DIAGNOSIS — Z12.83 SKIN CANCER SCREENING: ICD-10-CM

## 2022-03-10 DIAGNOSIS — Z87.891 PERSONAL HISTORY OF NICOTINE DEPENDENCE: ICD-10-CM

## 2022-03-10 DIAGNOSIS — L98.9 SKIN LESION OF BACK: ICD-10-CM

## 2022-03-10 DIAGNOSIS — Z72.0 TOBACCO ABUSE: ICD-10-CM

## 2022-03-10 DIAGNOSIS — Z23 NEED FOR TDAP VACCINATION: ICD-10-CM

## 2022-03-10 DIAGNOSIS — I10 PRIMARY HYPERTENSION: ICD-10-CM

## 2022-03-10 DIAGNOSIS — E78.2 MIXED HYPERLIPIDEMIA: ICD-10-CM

## 2022-03-10 PROCEDURE — G0402 INITIAL PREVENTIVE EXAM: HCPCS | Performed by: FAMILY MEDICINE

## 2022-03-10 PROCEDURE — 1126F AMNT PAIN NOTED NONE PRSNT: CPT | Performed by: FAMILY MEDICINE

## 2022-03-10 PROCEDURE — 90471 IMMUNIZATION ADMIN: CPT | Performed by: FAMILY MEDICINE

## 2022-03-10 PROCEDURE — 1170F FXNL STATUS ASSESSED: CPT | Performed by: FAMILY MEDICINE

## 2022-03-10 PROCEDURE — 1159F MED LIST DOCD IN RCRD: CPT | Performed by: FAMILY MEDICINE

## 2022-03-10 PROCEDURE — 90715 TDAP VACCINE 7 YRS/> IM: CPT | Performed by: FAMILY MEDICINE

## 2022-03-10 RX ORDER — BUPROPION HYDROCHLORIDE 150 MG/1
150 TABLET, EXTENDED RELEASE ORAL 2 TIMES DAILY
Qty: 30 TABLET | Refills: 2 | Status: SHIPPED | OUTPATIENT
Start: 2022-03-10 | End: 2022-05-19

## 2022-03-10 NOTE — PROGRESS NOTES
The ABCs of the Annual Wellness Visit  Welcome to Medicare Visit  Pt smokes 1-2 cigarettes daily- pt used to smoke 1ppd x 38 yrs- pt has no h/o seizures- discussed R&B of meds- pt wants to try zyban  Chief Complaint   Patient presents with   • Medicare Wellness-subsequent     Medicare physical     Subjective {   History of Present Illness:  Avelino Lowry is a 56 y.o. male who presents for a  Welcome to Medicare Visit.    The following portions of the patient's history were reviewed and   updated as appropriate:   He  has a past medical history of COPD (chronic obstructive pulmonary disease) (HCC).  He does not have any pertinent problems on file.  He  has no past surgical history on file.  His family history is not on file.  He  reports that he has quit smoking. He has never used smokeless tobacco. No history on file for alcohol use and drug use.  Current Outpatient Medications   Medication Sig Dispense Refill   • albuterol (PROVENTIL) (2.5 MG/3ML) 0.083% nebulizer solution USE ONE VIAL VIA NEBULIZER EVERY 4 TO 6 HOURS AS NEEDED     • calcium citrate (CALCITRATE) 950 (200 Ca) MG tablet calcium citrate 200 mg (950 mg) oral tablet take 1 tablet by oral route daily   Active     • Cobalamin Combinations (VITAMIN B12-FOLIC ACID PO) vitamin B12-folic acid 500-400 mcg oral tablet take 2 tablets by oral route daily   Active     • diclofenac (VOLTAREN) 75 MG EC tablet Take 1 tablet by mouth 2 (Two) Times a Day. 60 tablet 0   • escitalopram (LEXAPRO) 10 MG tablet TAKE ONE TABLET BY MOUTH EVERY DAY FOR ANXIETY 30 tablet 2   • hydroCHLOROthiazide (HYDRODIURIL) 25 MG tablet TAKE ONE TABLET BY MOUTH EVERY DAY 30 tablet 2   • ipratropium (ATROVENT) 0.02 % nebulizer solution 1.25 mL.     • multivitamin with minerals tablet tablet multivitamin oral tablet take 1 tablet by oral route daily   Active     • nabumetone (RELAFEN) 750 MG tablet Take 1 tablet by mouth 2 (Two) Times a Day. 60 tablet 1   • Omega 3-6-9 capsule Omega 3-6-9  1,200 mg oral capsule take 1 capsule by oral route daily   Active     • Potassium 99 MG tablet Take 1 tablet by mouth Daily.     • potassium chloride (MICRO-K) 10 MEQ CR capsule potassium chloride 10 mEq oral capsule, extended release take 1 capsule (10 meq) by oral route once daily with food   Active     • ProAir  (90 Base) MCG/ACT inhaler Inhale See Admin Instructions. Inhale 1-2 puffs by mouth every 4 to 6 hours as needed     • pseudoephedrine (SUDAFED) 60 MG tablet Sudogest 60 mg oral tablet take 1 tablet (60 mg) by oral route every 6 hours as needed   Active     • tamsulosin (FLOMAX) 0.4 MG capsule 24 hr capsule TAKE ONE CAPSULE BY MOUTH EVERY DAY 30 minutes following THE same meal each DAY 30 capsule 2   • Trelegy Ellipta 200-62.5-25 MCG/INH inhaler Inhale 1 puff Daily.     • vitamin E 400 UNIT capsule vitamin E 400 unit oral capsule take 1 capsule by oral route daily   Active     • buPROPion SR (Wellbutrin SR) 150 MG 12 hr tablet Take 1 tablet by mouth 2 (Two) Times a Day. 30 tablet 2     No current facility-administered medications for this visit.     Current Outpatient Medications on File Prior to Visit   Medication Sig   • albuterol (PROVENTIL) (2.5 MG/3ML) 0.083% nebulizer solution USE ONE VIAL VIA NEBULIZER EVERY 4 TO 6 HOURS AS NEEDED   • calcium citrate (CALCITRATE) 950 (200 Ca) MG tablet calcium citrate 200 mg (950 mg) oral tablet take 1 tablet by oral route daily   Active   • Cobalamin Combinations (VITAMIN B12-FOLIC ACID PO) vitamin B12-folic acid 500-400 mcg oral tablet take 2 tablets by oral route daily   Active   • diclofenac (VOLTAREN) 75 MG EC tablet Take 1 tablet by mouth 2 (Two) Times a Day.   • escitalopram (LEXAPRO) 10 MG tablet TAKE ONE TABLET BY MOUTH EVERY DAY FOR ANXIETY   • hydroCHLOROthiazide (HYDRODIURIL) 25 MG tablet TAKE ONE TABLET BY MOUTH EVERY DAY   • ipratropium (ATROVENT) 0.02 % nebulizer solution 1.25 mL.   • multivitamin with minerals tablet tablet multivitamin oral  tablet take 1 tablet by oral route daily   Active   • nabumetone (RELAFEN) 750 MG tablet Take 1 tablet by mouth 2 (Two) Times a Day.   • Omega 3-6-9 capsule Omega 3-6-9 1,200 mg oral capsule take 1 capsule by oral route daily   Active   • Potassium 99 MG tablet Take 1 tablet by mouth Daily.   • potassium chloride (MICRO-K) 10 MEQ CR capsule potassium chloride 10 mEq oral capsule, extended release take 1 capsule (10 meq) by oral route once daily with food   Active   • ProAir  (90 Base) MCG/ACT inhaler Inhale See Admin Instructions. Inhale 1-2 puffs by mouth every 4 to 6 hours as needed   • pseudoephedrine (SUDAFED) 60 MG tablet Sudogest 60 mg oral tablet take 1 tablet (60 mg) by oral route every 6 hours as needed   Active   • tamsulosin (FLOMAX) 0.4 MG capsule 24 hr capsule TAKE ONE CAPSULE BY MOUTH EVERY DAY 30 minutes following THE same meal each DAY   • Trelegy Ellipta 200-62.5-25 MCG/INH inhaler Inhale 1 puff Daily.   • vitamin E 400 UNIT capsule vitamin E 400 unit oral capsule take 1 capsule by oral route daily   Active     No current facility-administered medications on file prior to visit.     He has No Known Allergies..     Compared to one year ago, the patient feels his physical   health is the same.    Compared to one year ago, the patient feels his mental   health is the same.    Recent Hospitalizations:  He was not admitted to the hospital during the last year.       Current Medical Providers:  Patient Care Team:  Desmond Lloyd MD as PCP - General (Family Medicine)    Outpatient Medications Prior to Visit   Medication Sig Dispense Refill   • albuterol (PROVENTIL) (2.5 MG/3ML) 0.083% nebulizer solution USE ONE VIAL VIA NEBULIZER EVERY 4 TO 6 HOURS AS NEEDED     • calcium citrate (CALCITRATE) 950 (200 Ca) MG tablet calcium citrate 200 mg (950 mg) oral tablet take 1 tablet by oral route daily   Active     • Cobalamin Combinations (VITAMIN B12-FOLIC ACID PO) vitamin B12-folic acid 500-400 mcg  oral tablet take 2 tablets by oral route daily   Active     • diclofenac (VOLTAREN) 75 MG EC tablet Take 1 tablet by mouth 2 (Two) Times a Day. 60 tablet 0   • escitalopram (LEXAPRO) 10 MG tablet TAKE ONE TABLET BY MOUTH EVERY DAY FOR ANXIETY 30 tablet 2   • hydroCHLOROthiazide (HYDRODIURIL) 25 MG tablet TAKE ONE TABLET BY MOUTH EVERY DAY 30 tablet 2   • ipratropium (ATROVENT) 0.02 % nebulizer solution 1.25 mL.     • multivitamin with minerals tablet tablet multivitamin oral tablet take 1 tablet by oral route daily   Active     • nabumetone (RELAFEN) 750 MG tablet Take 1 tablet by mouth 2 (Two) Times a Day. 60 tablet 1   • Omega 3-6-9 capsule Omega 3-6-9 1,200 mg oral capsule take 1 capsule by oral route daily   Active     • Potassium 99 MG tablet Take 1 tablet by mouth Daily.     • potassium chloride (MICRO-K) 10 MEQ CR capsule potassium chloride 10 mEq oral capsule, extended release take 1 capsule (10 meq) by oral route once daily with food   Active     • ProAir  (90 Base) MCG/ACT inhaler Inhale See Admin Instructions. Inhale 1-2 puffs by mouth every 4 to 6 hours as needed     • pseudoephedrine (SUDAFED) 60 MG tablet Sudogest 60 mg oral tablet take 1 tablet (60 mg) by oral route every 6 hours as needed   Active     • tamsulosin (FLOMAX) 0.4 MG capsule 24 hr capsule TAKE ONE CAPSULE BY MOUTH EVERY DAY 30 minutes following THE same meal each DAY 30 capsule 2   • Trelegy Ellipta 200-62.5-25 MCG/INH inhaler Inhale 1 puff Daily.     • vitamin E 400 UNIT capsule vitamin E 400 unit oral capsule take 1 capsule by oral route daily   Active       No facility-administered medications prior to visit.       No opioid medication identified on active medication list. I have reviewed chart for other potential  high risk medication/s and harmful drug interactions in the elderly.          Aspirin is not on active medication list.  Aspirin use is not indicated based on review of current medical condition/s. Risk of harm  "outweighs potential benefits.  .    Patient Active Problem List   Diagnosis   • Chronic obstructive pulmonary disease (HCC)     Advance Care Planning  Advance Directive is not on file.  ACP discussion was held with the patient during this visit. Patient has an advance directive (not in EMR), copy requested.    Review of Systems   Constitutional: Negative for fatigue and fever.   HENT: Negative for sore throat.    Eyes: Negative for visual disturbance.   Respiratory: Negative for chest tightness, shortness of breath and wheezing.    Cardiovascular: Negative for chest pain, palpitations and leg swelling.   Gastrointestinal: Negative for abdominal pain, diarrhea, nausea and vomiting.   Musculoskeletal: Negative for arthralgias.   Skin: Negative for rash.   Neurological: Negative for light-headedness.   Psychiatric/Behavioral: Negative for behavioral problems, decreased concentration, sleep disturbance and suicidal ideas. The patient is not nervous/anxious.         Objective      Vitals:    03/10/22 1610   BP: 142/78   Pulse: 85   Temp: 98.2 °F (36.8 °C)   SpO2: 95%   Weight: 78.9 kg (174 lb)   Height: 163.8 cm (64.5\")     BMI Readings from Last 1 Encounters:   03/10/22 29.41 kg/m²   BMI is above normal parameters. Recommendations include: exercise counseling and nutrition counseling    Does the patient have evidence of cognitive impairment? No    Physical Exam  Vitals reviewed.   Constitutional:       Appearance: Normal appearance. He is well-developed.   HENT:      Head: Normocephalic and atraumatic.      Right Ear: External ear normal.      Left Ear: External ear normal.      Mouth/Throat:      Pharynx: No oropharyngeal exudate.   Eyes:      Conjunctiva/sclera: Conjunctivae normal.      Pupils: Pupils are equal, round, and reactive to light.   Cardiovascular:      Rate and Rhythm: Normal rate and regular rhythm.      Pulses: Normal pulses.      Heart sounds: Normal heart sounds. No murmur heard.    No friction rub. " No gallop.   Pulmonary:      Effort: Pulmonary effort is normal.      Breath sounds: Normal breath sounds. No wheezing or rhonchi.   Abdominal:      General: Abdomen is flat. Bowel sounds are normal. There is no distension.      Palpations: Abdomen is soft. There is no mass.      Tenderness: There is no abdominal tenderness. There is no guarding or rebound.      Hernia: No hernia is present.   Musculoskeletal:         General: Normal range of motion.   Skin:     General: Skin is warm and dry.      Capillary Refill: Capillary refill takes less than 2 seconds.      Comments: Pt has two 1cm flat irregularly pigmented lesions on back   Neurological:      General: No focal deficit present.      Mental Status: He is alert and oriented to person, place, and time.      Cranial Nerves: No cranial nerve deficit.   Psychiatric:         Mood and Affect: Mood and affect normal.         Behavior: Behavior normal.         Thought Content: Thought content normal.         Judgment: Judgment normal.              Procedures       HEALTH RISK ASSESSMENT    Smoking Status:  Social History     Tobacco Use   Smoking Status Former Smoker   Smokeless Tobacco Never Used   Tobacco Comment    stopped at age 52     Alcohol Consumption:  Social History     Substance and Sexual Activity   Alcohol Use None       Fall Risk Screen:    [facility] Fall Risk Assessment has not been completed.    Depression Screen:   PHQ-2/PHQ-9 Depression Screening 3/10/2022   Retired PHQ-9 Total Score -   Retired Total Score -   Little Interest or Pleasure in Doing Things 0-->not at all   Feeling Down, Depressed or Hopeless 0-->not at all   PHQ-9: Brief Depression Severity Measure Score 0       Health Habits and Functional and Cognitive Screening:  Functional & Cognitive Status 3/10/2022   Do you have difficulty preparing food and eating? No   Do you have difficulty bathing yourself, getting dressed or grooming yourself? No   Do you have difficulty using the toilet? No    Do you have difficulty moving around from place to place? No   Do you have trouble with steps or getting out of a bed or a chair? No   Current Diet Well Balanced Diet   Dental Exam Not up to date   Eye Exam Up to date   Exercise (times per week) 0 times per week   Current Exercises Include No Regular Exercise   Do you need help using the phone?  No   Are you deaf or do you have serious difficulty hearing?  No   Do you need help with transportation? No   Do you need help shopping? No   Do you need help preparing meals?  No   Do you need help with housework?  No   Do you need help with laundry? No   Do you need help taking your medications? No   Do you need help managing money? No   Do you ever drive or ride in a car without wearing a seat belt? No   Have you felt unusual stress, anger or loneliness in the last month? No   Who do you live with? Spouse   If you need help, do you have trouble finding someone available to you? No   Have you been bothered in the last four weeks by sexual problems? No   Do you have difficulty concentrating, remembering or making decisions? No       Visual Acuity:    No exam data present    Age-appropriate Screening Schedule:  Refer to the list below for future screening recommendations based on patient's age, sex and/or medical conditions. Orders for these recommended tests are listed in the plan section. The patient has been provided with a written plan.    Health Maintenance   Topic Date Due   • TDAP/TD VACCINES (1 - Tdap) Never done   • ZOSTER VACCINE (1 of 2) Never done   • LIPID PANEL  03/10/2022   • INFLUENZA VACCINE  Completed          Assessment/Plan   CMS Preventative Services Quick Reference  Risk Factors Identified During Encounter  Immunizations Discussed/Encouraged (specific Immunizations; Tdap and COVID19  Obesity/Overweight   The above risks/problems have been discussed with the patient.  Pertinent information has been shared with the patient in the After Visit  Summary.  Follow up plans and orders are seen below in the Assessment/Plan Section.    Diagnoses and all orders for this visit:    1. Medicare welcome exam (Primary)  -     Hepatitis C Antibody    2. Mixed hyperlipidemia    3. Primary hypertension  -     CBC Auto Differential  -     Comprehensive Metabolic Panel  -     Lipid Panel  -     TSH+Free T4    4. Tobacco abuse  -      CT Chest Low Dose Cancer Screening WO; Future  -     buPROPion SR (Wellbutrin SR) 150 MG 12 hr tablet; Take 1 tablet by mouth 2 (Two) Times a Day.  Dispense: 30 tablet; Refill: 2    5. Personal history of nicotine dependence   -      CT Chest Low Dose Cancer Screening WO; Future    6. Screening PSA (prostate specific antigen)  -     PSA Screen    7. Need for Tdap vaccination  -     Tdap Vaccine Greater Than or Equal To 8yo IM    8. Skin lesion of back  -     Cancel: Ambulatory Referral to Dermatology  -     Ambulatory Referral to Dermatology    9. Skin cancer screening  -     Cancel: Ambulatory Referral to Dermatology  -     Ambulatory Referral to Dermatology        Follow Up:   No follow-ups on file.     An After Visit Summary and PPPS were made available to the patient.

## 2022-03-16 ENCOUNTER — HOSPITAL ENCOUNTER (OUTPATIENT)
Dept: CT IMAGING | Facility: HOSPITAL | Age: 57
Discharge: HOME | End: 2022-03-16
Admitting: FAMILY MEDICINE

## 2022-03-16 ENCOUNTER — CLINICAL SUPPORT (OUTPATIENT)
Dept: FAMILY MEDICINE CLINIC | Facility: CLINIC | Age: 57
End: 2022-03-16

## 2022-03-16 DIAGNOSIS — Z72.0 TOBACCO ABUSE: ICD-10-CM

## 2022-03-16 DIAGNOSIS — Z87.891 PERSONAL HISTORY OF NICOTINE DEPENDENCE: ICD-10-CM

## 2022-03-16 DIAGNOSIS — M79.10 MUSCLE PAIN: ICD-10-CM

## 2022-03-16 DIAGNOSIS — R25.2 MUSCLE CRAMPS: ICD-10-CM

## 2022-03-16 LAB
ALBUMIN SERPL-MCNC: 4.8 G/DL (ref 3.5–5.2)
ALBUMIN/GLOB SERPL: 1.8 G/DL
ALP SERPL-CCNC: 85 U/L (ref 39–117)
ALT SERPL W P-5'-P-CCNC: 17 U/L (ref 1–41)
ANION GAP SERPL CALCULATED.3IONS-SCNC: 9 MMOL/L (ref 5–15)
AST SERPL-CCNC: 23 U/L (ref 1–40)
BASOPHILS # BLD AUTO: 0.05 10*3/MM3 (ref 0–0.2)
BASOPHILS NFR BLD AUTO: 0.7 % (ref 0–1.5)
BILIRUB SERPL-MCNC: 1.2 MG/DL (ref 0–1.2)
BUN SERPL-MCNC: 11 MG/DL (ref 6–20)
BUN/CREAT SERPL: 10.8 (ref 7–25)
CALCIUM SPEC-SCNC: 9.8 MG/DL (ref 8.6–10.5)
CHLORIDE SERPL-SCNC: 94 MMOL/L (ref 98–107)
CHOLEST SERPL-MCNC: 203 MG/DL (ref 0–200)
CK SERPL-CCNC: 141 U/L (ref 20–200)
CO2 SERPL-SCNC: 36 MMOL/L (ref 22–29)
CREAT SERPL-MCNC: 1.02 MG/DL (ref 0.76–1.27)
DEPRECATED RDW RBC AUTO: 42.8 FL (ref 37–54)
EGFRCR SERPLBLD CKD-EPI 2021: 86.8 ML/MIN/1.73
EOSINOPHIL # BLD AUTO: 0.16 10*3/MM3 (ref 0–0.4)
EOSINOPHIL NFR BLD AUTO: 2.3 % (ref 0.3–6.2)
ERYTHROCYTE [DISTWIDTH] IN BLOOD BY AUTOMATED COUNT: 12.4 % (ref 12.3–15.4)
GLOBULIN UR ELPH-MCNC: 2.7 GM/DL
GLUCOSE SERPL-MCNC: 92 MG/DL (ref 65–99)
HCT VFR BLD AUTO: 44.8 % (ref 37.5–51)
HCV AB SER DONR QL: NORMAL
HDLC SERPL-MCNC: 55 MG/DL (ref 40–60)
HGB BLD-MCNC: 14.8 G/DL (ref 13–17.7)
IMM GRANULOCYTES # BLD AUTO: 0.04 10*3/MM3 (ref 0–0.05)
IMM GRANULOCYTES NFR BLD AUTO: 0.6 % (ref 0–0.5)
LDLC SERPL CALC-MCNC: 137 MG/DL (ref 0–100)
LDLC/HDLC SERPL: 2.48 {RATIO}
LYMPHOCYTES # BLD AUTO: 1.54 10*3/MM3 (ref 0.7–3.1)
LYMPHOCYTES NFR BLD AUTO: 21.9 % (ref 19.6–45.3)
MCH RBC QN AUTO: 31.4 PG (ref 26.6–33)
MCHC RBC AUTO-ENTMCNC: 33 G/DL (ref 31.5–35.7)
MCV RBC AUTO: 94.9 FL (ref 79–97)
MONOCYTES # BLD AUTO: 0.46 10*3/MM3 (ref 0.1–0.9)
MONOCYTES NFR BLD AUTO: 6.5 % (ref 5–12)
NEUTROPHILS NFR BLD AUTO: 4.79 10*3/MM3 (ref 1.7–7)
NEUTROPHILS NFR BLD AUTO: 68 % (ref 42.7–76)
NRBC BLD AUTO-RTO: 0 /100 WBC (ref 0–0.2)
PLATELET # BLD AUTO: 324 10*3/MM3 (ref 140–450)
PMV BLD AUTO: 10.7 FL (ref 6–12)
POTASSIUM SERPL-SCNC: 4 MMOL/L (ref 3.5–5.2)
PROT SERPL-MCNC: 7.5 G/DL (ref 6–8.5)
PSA SERPL-MCNC: 0.41 NG/ML (ref 0–4)
RBC # BLD AUTO: 4.72 10*6/MM3 (ref 4.14–5.8)
SODIUM SERPL-SCNC: 139 MMOL/L (ref 136–145)
T4 FREE SERPL-MCNC: 1.61 NG/DL (ref 0.93–1.7)
TRIGL SERPL-MCNC: 59 MG/DL (ref 0–150)
TSH SERPL DL<=0.05 MIU/L-ACNC: 1.61 UIU/ML (ref 0.27–4.2)
VLDLC SERPL-MCNC: 11 MG/DL (ref 5–40)
WBC NRBC COR # BLD: 7.04 10*3/MM3 (ref 3.4–10.8)

## 2022-03-16 PROCEDURE — 80053 COMPREHEN METABOLIC PANEL: CPT | Performed by: FAMILY MEDICINE

## 2022-03-16 PROCEDURE — 82550 ASSAY OF CK (CPK): CPT | Performed by: FAMILY MEDICINE

## 2022-03-16 PROCEDURE — 84443 ASSAY THYROID STIM HORMONE: CPT | Performed by: FAMILY MEDICINE

## 2022-03-16 PROCEDURE — 80061 LIPID PANEL: CPT | Performed by: FAMILY MEDICINE

## 2022-03-16 PROCEDURE — 86803 HEPATITIS C AB TEST: CPT | Performed by: FAMILY MEDICINE

## 2022-03-16 PROCEDURE — G0103 PSA SCREENING: HCPCS | Performed by: FAMILY MEDICINE

## 2022-03-16 PROCEDURE — 84439 ASSAY OF FREE THYROXINE: CPT | Performed by: FAMILY MEDICINE

## 2022-03-16 PROCEDURE — 85025 COMPLETE CBC W/AUTO DIFF WBC: CPT | Performed by: FAMILY MEDICINE

## 2022-03-16 PROCEDURE — 71271 CT THORAX LUNG CANCER SCR C-: CPT

## 2022-03-16 PROCEDURE — 36415 COLL VENOUS BLD VENIPUNCTURE: CPT | Performed by: FAMILY MEDICINE

## 2022-03-16 NOTE — PROGRESS NOTES
Venipuncture Blood Specimen Collection  Venipuncture performed in left arm  by Jeanna Rodriguez with good hemostasis. Patient tolerated the procedure well without complications.   03/16/22   Jeanna Rdoriguez   Split-Thickness Skin Graft Text: The defect edges were debeveled with a #15 scalpel blade.  Given the location of the defect, shape of the defect and the proximity to free margins a split thickness skin graft was deemed most appropriate.  Using a sterile surgical marker, the primary defect shape was transferred to the donor site. The split thickness graft was then harvested.  The skin graft was then placed in the primary defect and oriented appropriately.

## 2022-03-17 ENCOUNTER — TELEPHONE (OUTPATIENT)
Dept: FAMILY MEDICINE CLINIC | Facility: CLINIC | Age: 57
End: 2022-03-17

## 2022-03-17 NOTE — TELEPHONE ENCOUNTER
Caller: Avelino Lowry    Relationship to patient: Self    Best call back number: [phone]    Patient is needing: PATIENT CALLED IN TO NOTIFY THE OFFICE THAT ALL OF HIS PRESCRIPTIONS HE WANTS CHANGED TO [facility] AND NOT [facility].

## 2022-03-17 NOTE — PROGRESS NOTES
Labs show no significant abnormalities except for mildly elevated cholesterol.  Watch diet and exercise.  Follow-up if you have any questions.

## 2022-03-24 DIAGNOSIS — N40.0 BENIGN PROSTATIC HYPERPLASIA WITHOUT LOWER URINARY TRACT SYMPTOMS: ICD-10-CM

## 2022-03-24 DIAGNOSIS — R60.9 EDEMA, UNSPECIFIED TYPE: ICD-10-CM

## 2022-03-24 DIAGNOSIS — F32.A ANXIETY AND DEPRESSION: Primary | ICD-10-CM

## 2022-03-24 DIAGNOSIS — F41.9 ANXIETY AND DEPRESSION: Primary | ICD-10-CM

## 2022-03-24 RX ORDER — ESCITALOPRAM OXALATE 10 MG/1
10 TABLET ORAL DAILY
Qty: 90 TABLET | Refills: 1 | Status: SHIPPED | OUTPATIENT
Start: 2022-03-24

## 2022-03-24 RX ORDER — TAMSULOSIN HYDROCHLORIDE 0.4 MG/1
1 CAPSULE ORAL DAILY
Qty: 90 CAPSULE | Refills: 1 | Status: SHIPPED | OUTPATIENT
Start: 2022-03-24

## 2022-03-24 RX ORDER — HYDROCHLOROTHIAZIDE 25 MG/1
25 TABLET ORAL DAILY
Qty: 90 TABLET | Refills: 1 | Status: SHIPPED | OUTPATIENT
Start: 2022-03-24

## 2022-04-22 ENCOUNTER — OFFICE VISIT (OUTPATIENT)
Dept: ORTHOPEDIC SURGERY | Facility: CLINIC | Age: 57
End: 2022-04-22

## 2022-04-22 VITALS — WEIGHT: 170 LBS | OXYGEN SATURATION: 93 % | HEART RATE: 83 BPM | HEIGHT: 66 IN | BODY MASS INDEX: 27.32 KG/M2

## 2022-04-22 DIAGNOSIS — M70.22 OLECRANON BURSITIS OF LEFT ELBOW: Primary | ICD-10-CM

## 2022-04-22 PROCEDURE — 99213 OFFICE O/P EST LOW 20 MIN: CPT | Performed by: ORTHOPAEDIC SURGERY

## 2022-04-22 PROCEDURE — 20600 DRAIN/INJ JOINT/BURSA W/O US: CPT | Performed by: ORTHOPAEDIC SURGERY

## 2022-04-22 RX ORDER — DICLOFENAC SODIUM 75 MG/1
75 TABLET, DELAYED RELEASE ORAL 2 TIMES DAILY
Qty: 60 TABLET | Refills: 1 | Status: SHIPPED | OUTPATIENT
Start: 2022-04-22 | End: 2022-05-19

## 2022-04-22 RX ORDER — CEPHALEXIN 500 MG/1
500 CAPSULE ORAL 3 TIMES DAILY
Qty: 15 CAPSULE | Refills: 0 | Status: SHIPPED | OUTPATIENT
Start: 2022-04-22 | End: 2022-05-19

## 2022-04-22 RX ADMIN — LIDOCAINE HYDROCHLORIDE 1 ML: 10 INJECTION, SOLUTION INFILTRATION; PERINEURAL at 14:53

## 2022-04-22 RX ADMIN — TRIAMCINOLONE ACETONIDE 40 MG: 40 INJECTION, SUSPENSION INTRA-ARTICULAR; INTRAMUSCULAR at 14:53

## 2022-04-22 NOTE — PROGRESS NOTES
"Chief Complaint  Follow-up of the Left Elbow     Subjective      Avelino Lowry presents to [facility] for a follow-up of left elbow. Patient is being treated for left olecranon bursitis, last visit patient had 13 cc aspirated from the elbow. The olecranon bursa has returned. He only had his elbow aspirated once, this aspiration has given him relief for 2 weeks.     No Known Allergies     Social History     Socioeconomic History   • Marital status:    Tobacco Use   • Smoking status: Former Smoker   • Smokeless tobacco: Never Used   • Tobacco comment: stopped at age 52   Vaping Use   • Vaping Use: Never used        Review of Systems     Objective   Vital Signs:   Pulse 83   Ht 167.6 cm (66\")   Wt 77.1 kg (170 lb)   SpO2 93%   BMI 27.44 kg/m²       Physical Exam  Constitutional:       Appearance: Normal appearance. Patient is well-developed and normal weight.   HENT:      Head: Normocephalic.      Right Ear: Hearing and external ear normal.      Left Ear: Hearing and external ear normal.      Nose: Nose normal.   Eyes:      Conjunctiva/sclera: Conjunctivae normal.   Cardiovascular:      Rate and Rhythm: Normal rate.   Pulmonary:      Effort: Pulmonary effort is normal.      Breath sounds: No wheezing or rales.   Abdominal:      Palpations: Abdomen is soft.      Tenderness: There is no abdominal tenderness.   Musculoskeletal:      Cervical back: Normal range of motion.   Skin:     Findings: No rash.   Neurological:      Mental Status: Patient is alert and oriented to person, place, and time.   Psychiatric:         Mood and Affect: Mood and affect normal.         Judgment: Judgment normal.       Ortho Exam      LEFT ELBOW: Good tone of deltoid, biceps, triceps, wrist extensors, and wrist flexors.  Full elbow flexion and extension. Intact wrist flexion and extension. Sensation grossly intact. Neurovascular intact.  Radial pulse 2+, ulnar pulse 2+. Olecranon bursa present, no " signs of infection.       Small Joint Arthrocentesis  Consent given by: patient  Site marked: site marked  Timeout: Immediately prior to procedure a time out was called to verify the correct patient, procedure, equipment, support staff and site/side marked as required   Procedure Details  Preparation: Patient was prepped and draped in the usual sterile fashion  Needle gauge: 23g.  Medications administered: 40 mg triamcinolone acetonide 40 MG/ML; 1 mL lidocaine 1 %  Aspirate amount: 20 mL  Aspirate: serous and yellow  Patient tolerance: patient tolerated the procedure well with no immediate complications            Imaging Results (Most Recent)     None           Result Review :         No results found.           Assessment and Plan     DX: Left elbow olecranon bursitis     Discussed repeat aspiration vs removal of olecranon bursitis. Patient opted to repeat aspiration.     Left elbow aspirated, he tolerated this well. This was followed by a steroid injection. 11 cc aspirated.     Call or return if worsening symptoms.    Follow Up     4 weeks       Patient was given instructions and counseling regarding his condition or for health maintenance advice. Please see specific information pulled into the AVS if appropriate.     Scribed for Rajni Yoder MD by Clarissa Dawson.  04/22/22   12:57 EDT    I have personally performed the services described in this document as scribed by the above individual and it is both accurate and complete. Rajni Yoder MD 04/23/22

## 2022-04-23 RX ORDER — LIDOCAINE HYDROCHLORIDE 10 MG/ML
1 INJECTION, SOLUTION INFILTRATION; PERINEURAL
Status: COMPLETED | OUTPATIENT
Start: 2022-04-22 | End: 2022-04-22

## 2022-04-23 RX ORDER — TRIAMCINOLONE ACETONIDE 40 MG/ML
40 INJECTION, SUSPENSION INTRA-ARTICULAR; INTRAMUSCULAR
Status: COMPLETED | OUTPATIENT
Start: 2022-04-22 | End: 2022-04-22

## 2022-05-19 ENCOUNTER — OFFICE VISIT (OUTPATIENT)
Dept: UROLOGY | Facility: CLINIC | Age: 57
End: 2022-05-19

## 2022-05-19 VITALS — WEIGHT: 178 LBS | BODY MASS INDEX: 28.61 KG/M2 | HEIGHT: 66 IN

## 2022-05-19 DIAGNOSIS — N50.811 TESTICULAR PAIN, RIGHT: Primary | ICD-10-CM

## 2022-05-19 DIAGNOSIS — R10.2 SUPRAPUBIC PAIN: ICD-10-CM

## 2022-05-19 LAB
BILIRUB BLD-MCNC: NEGATIVE MG/DL
CLARITY, POC: CLEAR
COLOR UR: YELLOW
EXPIRATION DATE: NORMAL
GLUCOSE UR STRIP-MCNC: NEGATIVE MG/DL
KETONES UR QL: NEGATIVE
LEUKOCYTE EST, POC: NEGATIVE
Lab: NORMAL
NITRITE UR-MCNC: NEGATIVE MG/ML
PH UR: 5.5 [PH] (ref 5–8)
PROT UR STRIP-MCNC: NEGATIVE MG/DL
RBC # UR STRIP: NEGATIVE /UL
SP GR UR: 1.02 (ref 1–1.03)
UROBILINOGEN UR QL: NORMAL

## 2022-05-19 PROCEDURE — 99213 OFFICE O/P EST LOW 20 MIN: CPT | Performed by: NURSE PRACTITIONER

## 2022-05-19 PROCEDURE — 81003 URINALYSIS AUTO W/O SCOPE: CPT | Performed by: NURSE PRACTITIONER

## 2022-05-19 NOTE — PROGRESS NOTES
"Chief Complaint: Rt Testicle Pain (1.5Y Follow up, 7/10 \"sharp\" pain. Pt reports a decreased in sexual libido, and non-existence semen presence during ejaculation  )    Subjective         History of Present Illness  Avelino Lowry is a 57 y.o. male presents to [facility] to be seen for f/u testicular pain.    He has a low libido. No interest in sex.     No semen with ejaculation.     Is on tamsulosin 0.4mg q day.      He is with persistent right anterior testicular pain. He states that the antibiotics never helped with pain.     The patient he would like to know if his testicular pain could be the cause of his low libido.             The patient reports some erectile dysfunction he has issues with premature ejaculation.     He reports that his testicular pain has now resolved and has not had any further issues with this.       Previous:  ultrasound from 11/4/2020 revealed no visible mass.  Normal echotexture, size, and flow.  Epididymis normal.  No visible mass or cyst.  No hydrocele, varicocele or peristalsing bowel.  No scrotal wall edema.    Patient was previously seen by Dr. Ellen Khoury and treated with 2 weeks of ciprofloxacin.  The patient had no relief of his pain at that point in time.  However the pain slowly ceased.  He states that the pain approximately began to get worse about 3 months ago once again.         Objective     Past Medical History:   Diagnosis Date   • COPD (chronic obstructive pulmonary disease) (HCC)        History reviewed. No pertinent surgical history.      Current Outpatient Medications:   •  albuterol (PROVENTIL) (2.5 MG/3ML) 0.083% nebulizer solution, USE ONE VIAL VIA NEBULIZER EVERY 4 TO 6 HOURS AS NEEDED, Disp: , Rfl:   •  escitalopram (LEXAPRO) 10 MG tablet, Take 1 tablet by mouth Daily., Disp: 90 tablet, Rfl: 1  •  hydroCHLOROthiazide (HYDRODIURIL) 25 MG tablet, Take 1 tablet by mouth Daily., Disp: 90 tablet, Rfl: 1  •  ipratropium (ATROVENT) 0.02 % " "nebulizer solution, 1.25 mL., Disp: , Rfl:   •  multivitamin with minerals tablet tablet, multivitamin oral tablet take 1 tablet by oral route daily   Active, Disp: , Rfl:   •  Potassium 99 MG tablet, Take 1 tablet by mouth Every Other Day., Disp: , Rfl:   •  tamsulosin (FLOMAX) 0.4 MG capsule 24 hr capsule, Take 1 capsule by mouth Daily., Disp: 90 capsule, Rfl: 1  •  Trelegy Ellipta 200-62.5-25 MCG/INH inhaler, Inhale 1 puff Daily., Disp: , Rfl:     No Known Allergies     History reviewed. No pertinent family history.    Social History     Socioeconomic History   • Marital status:    Tobacco Use   • Smoking status: Former Smoker   • Smokeless tobacco: Never Used   • Tobacco comment: stopped at age 52   Vaping Use   • Vaping Use: Never used       Vital Signs:   Ht 167.6 cm (66\")   Wt 80.7 kg (178 lb)   BMI 28.73 kg/m²      Physical Exam     Result Review :   The following data was reviewed by: DAVE Salinas on 05/19/2022:  Results for orders placed or performed in visit on 05/19/22   POC Urinalysis Dipstick, Automated    Specimen: Urine   Result Value Ref Range    Color Yellow Yellow, Straw, Dark Yellow, Juliane    Clarity, UA Clear Clear    Specific Gravity  1.020 1.005 - 1.030    pH, Urine 5.5 5.0 - 8.0    Leukocytes Negative Negative    Nitrite, UA Negative Negative    Protein, POC Negative Negative mg/dL    Glucose, UA Negative Negative, 1000 mg/dL (3+) mg/dL    Ketones, UA Negative Negative    Urobilinogen, UA Normal Normal    Bilirubin Negative Negative    Blood, UA Negative Negative    Lot Number [id number]     Expiration Date 02/28/2023       PSA    PSA 3/16/22   PSA 0.412               Procedures        Assessment and Plan    Diagnoses and all orders for this visit:    1. Testicular pain, right (Primary)  -     POC Urinalysis Dipstick, Automated  -     CT Abdomen Pelvis With & Without Contrast; Future    2. Suprapubic pain  -     CT Abdomen Pelvis With & Without Contrast; Future      Discussed " with patient given continued pain I would like to go ahead and get him in for a CT scan of the abdomen pelvis that will go down to the level of the scrotum and testicles to further evaluate his pain.    Did discuss with patient and patient's wife I did do not believe specifically that the pain could affect his sex drive however if he is fearful of being intimate given the pain then this could potentially be the cause of this.    Did offer patient and patient's wife further diagnostic testing to potentially delineate a hormonal imbalance that could be causing his low libido however patient declines testing at this point in time until after we have his CT scan performed.    I spent 20 minutes caring for Avelino on this date of service. This time includes time spent by me in the following activities:reviewing tests, obtaining and/or reviewing a separately obtained history, performing a medically appropriate examination and/or evaluation , counseling and educating the patient/family/caregiver, ordering medications, tests, or procedures, and documenting information in the medical record  Follow Up   Return in about 4 weeks (around 6/16/2022) for f/u CT scan .  Patient was given instructions and counseling regarding his condition or for health maintenance advice. Please see specific information pulled into the AVS if appropriate.         This document has been electronically signed by DAVE Salinas  May 19, 2022 15:06 EDT

## 2022-06-15 ENCOUNTER — APPOINTMENT (OUTPATIENT)
Dept: CT IMAGING | Facility: HOSPITAL | Age: 57
End: 2022-06-15

## 2022-06-16 ENCOUNTER — TELEPHONE (OUTPATIENT)
Dept: SURGERY | Facility: CLINIC | Age: 57
End: 2022-06-16

## 2022-06-16 NOTE — TELEPHONE ENCOUNTER
Insurance rep called to inform you that the pt cpt codes did not work and you can do a P2P.[phone]

## 2022-07-07 DIAGNOSIS — N50.819 PAIN IN TESTICLE, UNSPECIFIED LATERALITY: ICD-10-CM

## 2022-07-07 DIAGNOSIS — K40.90 INGUINAL HERNIA WITHOUT OBSTRUCTION OR GANGRENE, RECURRENCE NOT SPECIFIED, UNSPECIFIED LATERALITY: Primary | ICD-10-CM

## 2022-07-07 DIAGNOSIS — R10.2 PELVIC PAIN: Primary | ICD-10-CM

## 2022-07-12 ENCOUNTER — APPOINTMENT (OUTPATIENT)
Dept: CT IMAGING | Facility: HOSPITAL | Age: 57
End: 2022-07-12

## 2022-07-12 ENCOUNTER — OFFICE VISIT (OUTPATIENT)
Dept: ORTHOPEDIC SURGERY | Facility: CLINIC | Age: 57
End: 2022-07-12

## 2022-07-12 VITALS — OXYGEN SATURATION: 97 % | BODY MASS INDEX: 28.51 KG/M2 | HEIGHT: 66 IN | HEART RATE: 76 BPM | WEIGHT: 177.4 LBS

## 2022-07-12 DIAGNOSIS — M25.522 LEFT ELBOW PAIN: Primary | ICD-10-CM

## 2022-07-12 DIAGNOSIS — M70.22 OLECRANON BURSITIS OF LEFT ELBOW: ICD-10-CM

## 2022-07-12 PROCEDURE — 99213 OFFICE O/P EST LOW 20 MIN: CPT | Performed by: ORTHOPAEDIC SURGERY

## 2022-07-12 RX ORDER — CEPHALEXIN 500 MG/1
500 CAPSULE ORAL 3 TIMES DAILY
Qty: 21 CAPSULE | Refills: 0 | Status: SHIPPED | OUTPATIENT
Start: 2022-07-12 | End: 2022-07-19

## 2022-07-13 ENCOUNTER — TELEPHONE (OUTPATIENT)
Dept: UROLOGY | Facility: CLINIC | Age: 57
End: 2022-07-13

## 2022-07-13 NOTE — PROGRESS NOTES
"Chief Complaint  Follow-up of the Left Elbow     Subjective      Avelino Lowry presents to [facility] for a follow-up of left elbow. Patient has an open area of the elbow that occurred about a week or two ago, he has some drainage in the elbow. The open wound area is elliptical shaped. He denies fevers and chills today.  He has been seen in the past for this elbow and treated for an olecranon bursitis. He had this drained in the past.     No Known Allergies     Social History     Socioeconomic History   • Marital status:    Tobacco Use   • Smoking status: Former Smoker   • Smokeless tobacco: Never Used   • Tobacco comment: stopped at age 52   Vaping Use   • Vaping Use: Never used        Review of Systems     Objective   Vital Signs:   Pulse 76   Ht 167.6 cm (66\")   Wt 80.5 kg (177 lb 6.4 oz)   SpO2 97%   BMI 28.63 kg/m²       Physical Exam  Constitutional:       Appearance: Normal appearance. Patient is well-developed and normal weight.   HENT:      Head: Normocephalic.      Right Ear: Hearing and external ear normal.      Left Ear: Hearing and external ear normal.      Nose: Nose normal.   Eyes:      Conjunctiva/sclera: Conjunctivae normal.   Cardiovascular:      Rate and Rhythm: Normal rate.   Pulmonary:      Effort: Pulmonary effort is normal.      Breath sounds: No wheezing or rales.   Abdominal:      Palpations: Abdomen is soft.      Tenderness: There is no abdominal tenderness.   Musculoskeletal:      Cervical back: Normal range of motion.   Skin:     Findings: No rash.   Neurological:      Mental Status: Patient is alert and oriented to person, place, and time.   Psychiatric:         Mood and Affect: Mood and affect normal.         Judgment: Judgment normal.       Ortho Exam      LEFT ELBOW: Mild drainage from the open wound. No active signs of infection. Mild redness. Sensation grossly intact. Neurovascular intact.  Radial pulse 2+, ulnar pulse 2+. Mild swelling. "       Procedures      Imaging Results (Most Recent)     Procedure Component Value Units Date/Time    XR Elbow 2 View Left [[id number]] Resulted: 07/12/22 0914     Updated: 07/12/22 0915           Result Review :     X-Ray Report:  Left elbow(s) X-Ray  Indication: Evaluation of left elbow pain   AP and Lateral view(s)  Findings: No significant osteophytes. No fractures.   Prior studies available for comparison: no     Assessment and Plan     Diagnoses and all orders for this visit:    1. Left elbow pain (Primary)  -     XR Elbow 2 View Left    2. Olecranon bursitis of left elbow  -     cephalexin (KEFLEX) 500 MG capsule; Take 1 capsule by mouth 3 (Three) Times a Day for 7 days.  Dispense: 21 capsule; Refill: 0        Patient was placed onto an antibiotic, set him up with wound care treatment plan. See how he does.      Call or return if worsening symptoms.    Follow Up     1 week.       Patient was given instructions and counseling regarding his condition or for health maintenance advice. Please see specific information pulled into the AVS if appropriate.     Scribed for Rajni Yoder MD by Clarissa Dawson.  07/13/22   14:13 EDT    I have personally performed the services described in this document as scribed by the above individual and it is both accurate and complete. Rajni Yoder MD 07/14/22

## 2022-07-26 ENCOUNTER — TELEPHONE (OUTPATIENT)
Dept: ORTHOPEDIC SURGERY | Facility: CLINIC | Age: 57
End: 2022-07-26

## 2022-07-26 NOTE — TELEPHONE ENCOUNTER
Patient stopped by our office today stating that his elbow is draining again and wanted us to look at the elbow.  I put patient in a room and Dr. Yoder came over and looked at the elbow and spoke with patient.  He told patient that there was two options for him as he did last visit and that was surgery or wound care.  Patient states that he went the antibiotic route but that did not work.  Patient said that he would go home and speak with his wife and call and let us know if he wanted to schedule the surgery.  Patient was given a card with Aury's name on it for him to call if he decided he wanted to proceed with surgery.  Dr. Yoder went over risk and benefits with patient.

## 2022-07-27 ENCOUNTER — PREP FOR SURGERY (OUTPATIENT)
Dept: OTHER | Facility: HOSPITAL | Age: 57
End: 2022-07-27

## 2022-07-27 DIAGNOSIS — M70.22 OLECRANON BURSITIS OF LEFT ELBOW: Primary | ICD-10-CM

## 2022-07-27 RX ORDER — ASPIRIN 81 MG/1
81 TABLET ORAL NIGHTLY
COMMUNITY

## 2022-07-27 RX ORDER — ALBUTEROL SULFATE 90 UG/1
2 AEROSOL, METERED RESPIRATORY (INHALATION) EVERY 4 HOURS PRN
COMMUNITY
Start: 2022-06-25

## 2022-07-27 RX ORDER — CEFAZOLIN SODIUM 2 G/100ML
2 INJECTION, SOLUTION INTRAVENOUS ONCE
Status: CANCELLED | OUTPATIENT
Start: 2022-07-27 | End: 2022-07-27

## 2022-07-27 RX ORDER — BUPROPION HYDROCHLORIDE 150 MG/1
150 TABLET, EXTENDED RELEASE ORAL 2 TIMES DAILY
COMMUNITY
Start: 2022-05-26

## 2022-07-27 RX ORDER — CEFAZOLIN SODIUM IN 0.9 % NACL 3 G/100 ML
3 INTRAVENOUS SOLUTION, PIGGYBACK (ML) INTRAVENOUS ONCE
Status: CANCELLED | OUTPATIENT
Start: 2022-07-27 | End: 2022-07-27

## 2022-07-29 ENCOUNTER — LAB (OUTPATIENT)
Dept: LAB | Facility: HOSPITAL | Age: 57
End: 2022-07-29

## 2022-07-29 DIAGNOSIS — M70.22 OLECRANON BURSITIS OF LEFT ELBOW: ICD-10-CM

## 2022-07-29 PROCEDURE — U0004 COV-19 TEST NON-CDC HGH THRU: HCPCS

## 2022-07-30 LAB — SARS-COV-2 RNA PNL SPEC NAA+PROBE: NOT DETECTED

## 2022-08-01 ENCOUNTER — ANESTHESIA EVENT (OUTPATIENT)
Dept: PERIOP | Facility: HOSPITAL | Age: 57
End: 2022-08-01

## 2022-08-01 ENCOUNTER — APPOINTMENT (OUTPATIENT)
Dept: CT IMAGING | Facility: HOSPITAL | Age: 57
End: 2022-08-01

## 2022-08-03 ENCOUNTER — HOSPITAL ENCOUNTER (OUTPATIENT)
Facility: HOSPITAL | Age: 57
Setting detail: HOSPITAL OUTPATIENT SURGERY
Discharge: HOME | End: 2022-08-03
Attending: ORTHOPAEDIC SURGERY | Admitting: ORTHOPAEDIC SURGERY

## 2022-08-03 ENCOUNTER — ANESTHESIA (OUTPATIENT)
Dept: PERIOP | Facility: HOSPITAL | Age: 57
End: 2022-08-03

## 2022-08-03 VITALS
RESPIRATION RATE: 17 BRPM | DIASTOLIC BLOOD PRESSURE: 77 MMHG | BODY MASS INDEX: 28.38 KG/M2 | TEMPERATURE: 98.4 F | SYSTOLIC BLOOD PRESSURE: 138 MMHG | OXYGEN SATURATION: 92 % | HEART RATE: 69 BPM | WEIGHT: 176.59 LBS | HEIGHT: 66 IN

## 2022-08-03 DIAGNOSIS — M70.22 OLECRANON BURSITIS OF LEFT ELBOW: ICD-10-CM

## 2022-08-03 PROCEDURE — 25010000002 CEFAZOLIN IN DEXTROSE 2-4 GM/100ML-% SOLUTION: Performed by: ORTHOPAEDIC SURGERY

## 2022-08-03 PROCEDURE — 25010000002 DEXAMETHASONE PER 1 MG: Performed by: NURSE ANESTHETIST, CERTIFIED REGISTERED

## 2022-08-03 PROCEDURE — 25010000002 ONDANSETRON PER 1 MG: Performed by: NURSE ANESTHETIST, CERTIFIED REGISTERED

## 2022-08-03 PROCEDURE — 25010000002 FENTANYL CITRATE (PF) 50 MCG/ML SOLUTION: Performed by: NURSE ANESTHETIST, CERTIFIED REGISTERED

## 2022-08-03 PROCEDURE — 25010000002 KETOROLAC TROMETHAMINE PER 15 MG: Performed by: NURSE ANESTHETIST, CERTIFIED REGISTERED

## 2022-08-03 PROCEDURE — 88304 TISSUE EXAM BY PATHOLOGIST: CPT | Performed by: ORTHOPAEDIC SURGERY

## 2022-08-03 PROCEDURE — 25010000002 MIDAZOLAM PER 1 MG: Performed by: ANESTHESIOLOGY

## 2022-08-03 PROCEDURE — 93005 ELECTROCARDIOGRAM TRACING: CPT | Performed by: ANESTHESIOLOGY

## 2022-08-03 PROCEDURE — 24105 EXCISION OLECRANON BURSA: CPT | Performed by: ORTHOPAEDIC SURGERY

## 2022-08-03 PROCEDURE — 25010000002 PROPOFOL 10 MG/ML EMULSION: Performed by: NURSE ANESTHETIST, CERTIFIED REGISTERED

## 2022-08-03 RX ORDER — PROMETHAZINE HYDROCHLORIDE 12.5 MG/1
25 TABLET ORAL ONCE AS NEEDED
Status: DISCONTINUED | OUTPATIENT
Start: 2022-08-03 | End: 2022-08-03 | Stop reason: HOSPADM

## 2022-08-03 RX ORDER — PROMETHAZINE HYDROCHLORIDE 25 MG/1
25 SUPPOSITORY RECTAL ONCE AS NEEDED
Status: DISCONTINUED | OUTPATIENT
Start: 2022-08-03 | End: 2022-08-03 | Stop reason: HOSPADM

## 2022-08-03 RX ORDER — MAGNESIUM HYDROXIDE 1200 MG/15ML
LIQUID ORAL AS NEEDED
Status: DISCONTINUED | OUTPATIENT
Start: 2022-08-03 | End: 2022-08-03 | Stop reason: HOSPADM

## 2022-08-03 RX ORDER — BUPIVACAINE HYDROCHLORIDE AND EPINEPHRINE 5; 5 MG/ML; UG/ML
INJECTION, SOLUTION EPIDURAL; INTRACAUDAL; PERINEURAL AS NEEDED
Status: DISCONTINUED | OUTPATIENT
Start: 2022-08-03 | End: 2022-08-03 | Stop reason: HOSPADM

## 2022-08-03 RX ORDER — ONDANSETRON 2 MG/ML
INJECTION INTRAMUSCULAR; INTRAVENOUS AS NEEDED
Status: DISCONTINUED | OUTPATIENT
Start: 2022-08-03 | End: 2022-08-03 | Stop reason: SURG

## 2022-08-03 RX ORDER — CEFAZOLIN SODIUM IN 0.9 % NACL 3 G/100 ML
3 INTRAVENOUS SOLUTION, PIGGYBACK (ML) INTRAVENOUS ONCE
Status: DISCONTINUED | OUTPATIENT
Start: 2022-08-03 | End: 2022-08-03 | Stop reason: DRUGHIGH

## 2022-08-03 RX ORDER — OXYCODONE HYDROCHLORIDE 5 MG/1
5 TABLET ORAL
Status: DISCONTINUED | OUTPATIENT
Start: 2022-08-03 | End: 2022-08-03 | Stop reason: HOSPADM

## 2022-08-03 RX ORDER — PROPOFOL 10 MG/ML
VIAL (ML) INTRAVENOUS AS NEEDED
Status: DISCONTINUED | OUTPATIENT
Start: 2022-08-03 | End: 2022-08-03 | Stop reason: SURG

## 2022-08-03 RX ORDER — ONDANSETRON 2 MG/ML
4 INJECTION INTRAMUSCULAR; INTRAVENOUS ONCE AS NEEDED
Status: DISCONTINUED | OUTPATIENT
Start: 2022-08-03 | End: 2022-08-03 | Stop reason: HOSPADM

## 2022-08-03 RX ORDER — KETOROLAC TROMETHAMINE 30 MG/ML
INJECTION, SOLUTION INTRAMUSCULAR; INTRAVENOUS AS NEEDED
Status: DISCONTINUED | OUTPATIENT
Start: 2022-08-03 | End: 2022-08-03 | Stop reason: SURG

## 2022-08-03 RX ORDER — MIDAZOLAM HYDROCHLORIDE 1 MG/ML
2 INJECTION INTRAMUSCULAR; INTRAVENOUS ONCE
Status: COMPLETED | OUTPATIENT
Start: 2022-08-03 | End: 2022-08-03

## 2022-08-03 RX ORDER — FENTANYL CITRATE 50 UG/ML
INJECTION, SOLUTION INTRAMUSCULAR; INTRAVENOUS AS NEEDED
Status: DISCONTINUED | OUTPATIENT
Start: 2022-08-03 | End: 2022-08-03 | Stop reason: SURG

## 2022-08-03 RX ORDER — MEPERIDINE HYDROCHLORIDE 25 MG/ML
12.5 INJECTION INTRAMUSCULAR; INTRAVENOUS; SUBCUTANEOUS
Status: DISCONTINUED | OUTPATIENT
Start: 2022-08-03 | End: 2022-08-03 | Stop reason: HOSPADM

## 2022-08-03 RX ORDER — LIDOCAINE HYDROCHLORIDE 20 MG/ML
INJECTION, SOLUTION EPIDURAL; INFILTRATION; INTRACAUDAL; PERINEURAL AS NEEDED
Status: DISCONTINUED | OUTPATIENT
Start: 2022-08-03 | End: 2022-08-03 | Stop reason: SURG

## 2022-08-03 RX ORDER — SODIUM CHLORIDE, SODIUM LACTATE, POTASSIUM CHLORIDE, CALCIUM CHLORIDE 600; 310; 30; 20 MG/100ML; MG/100ML; MG/100ML; MG/100ML
9 INJECTION, SOLUTION INTRAVENOUS CONTINUOUS PRN
Status: DISCONTINUED | OUTPATIENT
Start: 2022-08-03 | End: 2022-08-03 | Stop reason: HOSPADM

## 2022-08-03 RX ORDER — HYDROCODONE BITARTRATE AND ACETAMINOPHEN 7.5; 325 MG/1; MG/1
1 TABLET ORAL ONCE AS NEEDED
Status: CANCELLED | OUTPATIENT
Start: 2022-08-03 | End: 2022-08-10

## 2022-08-03 RX ORDER — GLYCOPYRROLATE 0.2 MG/ML
0.2 INJECTION INTRAMUSCULAR; INTRAVENOUS
Status: COMPLETED | OUTPATIENT
Start: 2022-08-03 | End: 2022-08-03

## 2022-08-03 RX ORDER — CEPHALEXIN 500 MG/1
500 CAPSULE ORAL EVERY 6 HOURS
Qty: 20 CAPSULE | Refills: 0 | Status: SHIPPED | OUTPATIENT
Start: 2022-08-03

## 2022-08-03 RX ORDER — DEXMEDETOMIDINE HYDROCHLORIDE 100 UG/ML
INJECTION, SOLUTION INTRAVENOUS AS NEEDED
Status: DISCONTINUED | OUTPATIENT
Start: 2022-08-03 | End: 2022-08-03 | Stop reason: SURG

## 2022-08-03 RX ORDER — HYDROCODONE BITARTRATE AND ACETAMINOPHEN 7.5; 325 MG/1; MG/1
1 TABLET ORAL EVERY 4 HOURS PRN
Qty: 30 TABLET | Refills: 0 | Status: SHIPPED | OUTPATIENT
Start: 2022-08-03

## 2022-08-03 RX ORDER — ACETAMINOPHEN 500 MG
1000 TABLET ORAL ONCE
Status: COMPLETED | OUTPATIENT
Start: 2022-08-03 | End: 2022-08-03

## 2022-08-03 RX ORDER — CEFAZOLIN SODIUM 2 G/100ML
2 INJECTION, SOLUTION INTRAVENOUS ONCE
Status: COMPLETED | OUTPATIENT
Start: 2022-08-03 | End: 2022-08-03

## 2022-08-03 RX ORDER — DEXAMETHASONE SODIUM PHOSPHATE 4 MG/ML
INJECTION, SOLUTION INTRA-ARTICULAR; INTRALESIONAL; INTRAMUSCULAR; INTRAVENOUS; SOFT TISSUE AS NEEDED
Status: DISCONTINUED | OUTPATIENT
Start: 2022-08-03 | End: 2022-08-03 | Stop reason: SURG

## 2022-08-03 RX ADMIN — GLYCOPYRROLATE 0.2 MG: 0.2 INJECTION INTRAMUSCULAR; INTRAVENOUS at 09:42

## 2022-08-03 RX ADMIN — CEFAZOLIN SODIUM 2 G: 2 INJECTION, SOLUTION INTRAVENOUS at 09:58

## 2022-08-03 RX ADMIN — ACETAMINOPHEN 1000 MG: 500 TABLET ORAL at 09:11

## 2022-08-03 RX ADMIN — FENTANYL CITRATE 50 MCG: 50 INJECTION, SOLUTION INTRAMUSCULAR; INTRAVENOUS at 10:23

## 2022-08-03 RX ADMIN — SODIUM CHLORIDE, POTASSIUM CHLORIDE, SODIUM LACTATE AND CALCIUM CHLORIDE 9 ML/HR: 600; 310; 30; 20 INJECTION, SOLUTION INTRAVENOUS at 09:12

## 2022-08-03 RX ADMIN — LIDOCAINE HYDROCHLORIDE 100 MG: 20 INJECTION, SOLUTION EPIDURAL; INFILTRATION; INTRACAUDAL; PERINEURAL at 10:00

## 2022-08-03 RX ADMIN — DEXAMETHASONE SODIUM PHOSPHATE 4 MG: 4 INJECTION, SOLUTION INTRA-ARTICULAR; INTRALESIONAL; INTRAMUSCULAR; INTRAVENOUS; SOFT TISSUE at 10:20

## 2022-08-03 RX ADMIN — DEXMEDETOMIDINE HYDROCHLORIDE 20 MCG: 100 INJECTION, SOLUTION, CONCENTRATE INTRAVENOUS at 10:16

## 2022-08-03 RX ADMIN — PROPOFOL 250 MG: 10 INJECTION, EMULSION INTRAVENOUS at 10:00

## 2022-08-03 RX ADMIN — FENTANYL CITRATE 50 MCG: 50 INJECTION, SOLUTION INTRAMUSCULAR; INTRAVENOUS at 10:00

## 2022-08-03 RX ADMIN — KETOROLAC TROMETHAMINE 30 MG: 30 INJECTION, SOLUTION INTRAMUSCULAR; INTRAVENOUS at 10:29

## 2022-08-03 RX ADMIN — ONDANSETRON 4 MG: 2 INJECTION INTRAMUSCULAR; INTRAVENOUS at 10:20

## 2022-08-03 RX ADMIN — MIDAZOLAM HYDROCHLORIDE 2 MG: 2 INJECTION, SOLUTION INTRAMUSCULAR; INTRAVENOUS at 09:42

## 2022-08-03 RX ADMIN — DEXMEDETOMIDINE HYDROCHLORIDE 20 MCG: 100 INJECTION, SOLUTION, CONCENTRATE INTRAVENOUS at 10:05

## 2022-08-03 NOTE — ANESTHESIA POSTPROCEDURE EVALUATION
Patient: Avelino Lowry    Procedure Summary     Date: 08/03/22 Room / Location: [facility] OR  / [facility]    Anesthesia Start: 0955 Anesthesia Stop: 1051    Procedure: LEFT ELBOW OLECRANNON BURSA EXCISION (Left Elbow) Diagnosis:       Olecranon bursitis of left elbow      (Olecranon bursitis of left elbow [M70.22])    Surgeons: Rajni Yoder MD Provider: Joseph Santos MD    Anesthesia Type: general ASA Status: 3          Anesthesia Type: general    Vitals  Vitals Value Taken Time   BP 97/60 08/03/22 1054   Temp 36.1 °C (97 °F) 08/03/22 1049   Pulse 73 08/03/22 1058   Resp 17 08/03/22 1049   SpO2 97 % 08/03/22 1058   Vitals shown include unvalidated device data.        Post Anesthesia Care and Evaluation    Patient location during evaluation: bedside  Patient participation: complete - patient participated  Level of consciousness: awake  Pain management: adequate    Airway patency: patent  Anesthetic complications: No anesthetic complications  PONV Status: none  Cardiovascular status: acceptable and stable  Respiratory status: acceptable and room air  Hydration status: acceptable    Comments: An Anesthesiologist personally participated in the most demanding procedures (including induction and emergence if applicable) in the anesthesia plan, monitored the course of anesthesia administration at frequent intervals and remained physically present and available for immediate diagnosis and treatment of emergencies.

## 2022-08-03 NOTE — DISCHARGE INSTRUCTIONS
DISCHARGE INSTRUCTIONS  ORTHOPEDICS      For your surgery you had:  General anesthesia (you may have a sore throat for the first 24 hours)    Local anesthesia    You may experience dizziness, drowsiness, or light-headedness for several hours following surgery  Do not stay alone today or tonight.  Limit your activity for 24 hours.  Resume your diet slowly.  Follow whatever special dietary instructions you may have been given by the doctor.  You should not drive or operate machinery or drink alcohol for 24 hours or while you are taking pain medication.  You should not sign any legally binding documents.  If you had an axillary or regional block, you will not have control of the involved limb for up to 12 hours.  Protect the arm with a sling or follow your physician's specific instructions.      You may shower or bathe: keep splint dry   Sleep with the injured part elevated on a pillow.  Medications per physician's instructions as indicated on Discharge Medication Information Sheet.  Follow verbal instructions of your doctor.  Carry the upper arm in a sling so that the hand and wrist are above the level of the heart.    Exercise fingers or toes for 10 minutes every hour while awake. Ice bag to injured area for 72 hours.  Apply 20 minutes on - 20 minutes off.  Never place ice directly on skin or cast.    Avoid getting cast or dressing wet.    In addition to these instructions, follow the discharge instructions on postoperative arthroscopic surgery.  SPECIAL INSTRUCTIONS:     Keep splint clean dry and intact until follow up       Last dose of pain medication was given at:    NOTIFY THE PHYSICIAN IF YOU EXPERIENCE:  Numbness of fingers or toes.  Inability to move fingers or toes.  Extreme coldness, paleness or blue dis-coloration of fingers or toes.  Excessive swelling of affected surgical site or swelling that causes the cast to rub or cut into skin.  Pain unrelieved by pain medication  Nausea/vomiting not relieved by  prescribed medication  Unable to urinate in 6 hours after surgery  Temperature greater than 101 degree Fahrenheit or chills  If unable to reach your doctor, please go to the closest emergency room  You should see   for follow-up care   on   .   Phone number:

## 2022-08-03 NOTE — ANESTHESIA PREPROCEDURE EVALUATION
Anesthesia Evaluation     Patient summary reviewed and Nursing notes reviewed   NPO Solid Status: > 6 hours  NPO Liquid Status: > 6 hours           Airway   Mallampati: II  TM distance: <3 FB  Possible difficult intubation  Dental      Pulmonary - normal exam   (+) COPD, shortness of breath, sleep apnea,   Cardiovascular - negative cardio ROS and normal exam  Exercise tolerance: good (4-7 METS)        Neuro/Psych  GI/Hepatic/Renal/Endo - negative ROS     Musculoskeletal     Abdominal    Substance History      OB/GYN          Other                        Anesthesia Plan    ASA 3     general     intravenous induction     Anesthetic plan, risks, benefits, and alternatives have been provided, discussed and informed consent has been obtained with: patient.        CODE STATUS:

## 2022-08-05 LAB
CYTO UR: NORMAL
LAB AP CASE REPORT: NORMAL
LAB AP CLINICAL INFORMATION: NORMAL
PATH REPORT.FINAL DX SPEC: NORMAL
PATH REPORT.GROSS SPEC: NORMAL

## 2022-08-09 LAB — QT INTERVAL: 397 MS

## 2022-08-11 ENCOUNTER — TELEPHONE (OUTPATIENT)
Dept: ORTHOPEDIC SURGERY | Facility: CLINIC | Age: 57
End: 2022-08-11

## 2022-08-18 ENCOUNTER — OFFICE VISIT (OUTPATIENT)
Dept: ORTHOPEDIC SURGERY | Facility: CLINIC | Age: 57
End: 2022-08-18

## 2022-08-18 VITALS — HEIGHT: 66 IN | BODY MASS INDEX: 28.28 KG/M2 | WEIGHT: 176 LBS

## 2022-08-18 DIAGNOSIS — Z47.89 ORTHOPEDIC AFTERCARE: Primary | ICD-10-CM

## 2022-08-18 PROCEDURE — 99024 POSTOP FOLLOW-UP VISIT: CPT | Performed by: ORTHOPAEDIC SURGERY

## 2022-08-18 NOTE — PROGRESS NOTES
"Chief Complaint  Follow-up of the Left Elbow     Subjective      Avelino Lowry presents to [facility] for a follow-up of left elbow. Patient is s/p left elbow olecranon bursa excision performed on 8/3/22.  He is doing well, he states that he has a bit of an ache in the elbow.     No Known Allergies     Social History     Socioeconomic History   • Marital status:    Tobacco Use   • Smoking status: Former Smoker   • Smokeless tobacco: Never Used   • Tobacco comment: stopped at age 52. smoked this morning.   Vaping Use   • Vaping Use: Never used   Substance and Sexual Activity   • Alcohol use: Never   • Drug use: Never   • Sexual activity: Defer        Review of Systems     Objective   Vital Signs:   Ht 167.6 cm (66\")   Wt 79.8 kg (176 lb)   BMI 28.41 kg/m²       Physical Exam  Constitutional:       Appearance: Normal appearance. Patient is well-developed and normal weight.   HENT:      Head: Normocephalic.      Right Ear: Hearing and external ear normal.      Left Ear: Hearing and external ear normal.      Nose: Nose normal.   Eyes:      Conjunctiva/sclera: Conjunctivae normal.   Cardiovascular:      Rate and Rhythm: Normal rate.   Pulmonary:      Effort: Pulmonary effort is normal.      Breath sounds: No wheezing or rales.   Abdominal:      Palpations: Abdomen is soft.      Tenderness: There is no abdominal tenderness.   Musculoskeletal:      Cervical back: Normal range of motion.   Skin:     Findings: No rash.   Neurological:      Mental Status: Patient is alert and oriented to person, place, and time.   Psychiatric:         Mood and Affect: Mood and affect normal.         Judgment: Judgment normal.       Ortho Exam      LEFT ELBOW: Splint removed. Stitches removed. Mild swelling. Incisions well healing, no drainage or signs of infection. No redness. Sensation grossly intact. Neurovascular intact. Radial pulse 2+, ulnar pulse 2+. Intact wrist range of motion. Full flexion. " Near full extension.       Procedures      Imaging Results (Most Recent)     None           Result Review :         No results found.          Assessment and Plan     Diagnoses and all orders for this visit:    1. Aftercare following left elbow olecranon bursa excision (Primary)        Patient to avoid applying pressure to the elbow and doing gentle range of motion.     Call or return if worsening symptoms.    Follow Up     3-4 weeks, may cancel if he is doing well.      Patient was given instructions and counseling regarding his condition or for health maintenance advice. Please see specific information pulled into the AVS if appropriate.     Scribed for Rajni Yoder MD by Clarissa Dawson.  08/18/22   09:05 EDT    I have personally performed the services described in this document as scribed by the above individual and it is both accurate and complete. Rajni Yoder MD 08/18/22

## 2022-08-22 ENCOUNTER — HOSPITAL ENCOUNTER (OUTPATIENT)
Dept: CT IMAGING | Facility: HOSPITAL | Age: 57
Discharge: HOME | End: 2022-08-22
Admitting: NURSE PRACTITIONER

## 2022-08-22 DIAGNOSIS — K40.90 INGUINAL HERNIA WITHOUT OBSTRUCTION OR GANGRENE, RECURRENCE NOT SPECIFIED, UNSPECIFIED LATERALITY: ICD-10-CM

## 2022-08-22 PROCEDURE — 72193 CT PELVIS W/DYE: CPT

## 2022-08-22 PROCEDURE — 0 IOPAMIDOL PER 1 ML: Performed by: NURSE PRACTITIONER

## 2022-08-22 RX ADMIN — IOPAMIDOL 100 ML: 755 INJECTION, SOLUTION INTRAVENOUS at 15:22

## 2022-08-23 ENCOUNTER — TELEPHONE (OUTPATIENT)
Dept: UROLOGY | Facility: CLINIC | Age: 57
End: 2022-08-23

## 2022-08-23 NOTE — TELEPHONE ENCOUNTER
Spoke to pt regarding results.  Pt will let us know if he wants to do the US.  Pt will keep his appt for 9/2/22.  He will discuss US at that time.

## 2022-08-23 NOTE — TELEPHONE ENCOUNTER
----- Message from DAVE Salinas sent at 8/23/2022  9:08 AM EDT -----  Ct scan does not reveal cause of pain thy recommend another u/s of the scrotum/ testicles.

## 2022-08-25 DIAGNOSIS — R10.2 SUPRAPUBIC PAIN: ICD-10-CM

## 2022-08-25 DIAGNOSIS — N50.819 PAIN IN TESTICLE, UNSPECIFIED LATERALITY: Primary | ICD-10-CM

## 2022-08-29 ENCOUNTER — TRANSCRIBE ORDERS (OUTPATIENT)
Dept: ADMINISTRATIVE | Facility: HOSPITAL | Age: 57
End: 2022-08-29

## 2022-08-29 DIAGNOSIS — R10.2 SUPRAPUBIC PAIN: Primary | ICD-10-CM

## 2022-08-30 ENCOUNTER — HOSPITAL ENCOUNTER (OUTPATIENT)
Dept: CT IMAGING | Facility: HOSPITAL | Age: 57
Discharge: HOME | End: 2022-08-30

## 2022-08-30 DIAGNOSIS — R10.2 SUPRAPUBIC PAIN: ICD-10-CM

## 2022-12-12 ENCOUNTER — APPOINTMENT (OUTPATIENT)
Dept: GENERAL RADIOLOGY | Facility: HOSPITAL | Age: 57
End: 2022-12-12

## 2022-12-12 ENCOUNTER — HOSPITAL ENCOUNTER (EMERGENCY)
Facility: HOSPITAL | Age: 57
Discharge: HOME | End: 2022-12-12
Attending: EMERGENCY MEDICINE | Admitting: EMERGENCY MEDICINE

## 2022-12-12 VITALS
HEART RATE: 70 BPM | TEMPERATURE: 98.9 F | OXYGEN SATURATION: 91 % | SYSTOLIC BLOOD PRESSURE: 123 MMHG | WEIGHT: 172.4 LBS | HEIGHT: 66 IN | BODY MASS INDEX: 27.71 KG/M2 | RESPIRATION RATE: 16 BRPM | DIASTOLIC BLOOD PRESSURE: 63 MMHG

## 2022-12-12 DIAGNOSIS — S82.891A CLOSED FRACTURE OF RIGHT ANKLE, INITIAL ENCOUNTER: Primary | ICD-10-CM

## 2022-12-12 PROCEDURE — 73610 X-RAY EXAM OF ANKLE: CPT

## 2022-12-12 PROCEDURE — 99283 EMERGENCY DEPT VISIT LOW MDM: CPT

## 2022-12-12 RX ORDER — HYDROCODONE BITARTRATE AND ACETAMINOPHEN 5; 325 MG/1; MG/1
1 TABLET ORAL EVERY 6 HOURS PRN
Status: DISCONTINUED | OUTPATIENT
Start: 2022-12-12 | End: 2022-12-12 | Stop reason: HOSPADM

## 2022-12-12 RX ORDER — HYDROCODONE BITARTRATE AND ACETAMINOPHEN 5; 325 MG/1; MG/1
1 TABLET ORAL EVERY 6 HOURS PRN
Qty: 12 TABLET | Refills: 0 | Status: SHIPPED | OUTPATIENT
Start: 2022-12-12

## 2022-12-12 RX ADMIN — HYDROCODONE BITARTRATE AND ACETAMINOPHEN 1 TABLET: 5; 325 TABLET ORAL at 11:11

## 2022-12-12 NOTE — ED PROVIDER NOTES
Time: 10:13 AM EST  Arrived by: private car  Chief Complaint: ankle pain  History provided by: patient  History is limited by: N/A    History of Present Illness:  Patient is a 57 y.o. year old male who presents to the emergency department with ankle pain resulting from a fall off of steps at home early this morning when he was taking the dogs out for a walk.       History provided by:  Patient   used: No        Patient Care Team  Primary Care Provider: Mikey Harris    Past Medical History:     No Known Allergies  Past Medical History:   Diagnosis Date   • Acute exacerbation of chronic obstructive pulmonary disease (COPD) (Formerly [facility])     HX OF REPORTS HAS TO HAVE BEEN MECHANICALLY VENTED X 2   • COPD (chronic obstructive pulmonary disease) (Formerly [facility])     USE INHALERS AND NEBULIZER   • Hx of chest pain     REPORTS HX OF CP AND WAS SEEN BY DR PEDRAZA IN [place] AND HAD CARDIAC TESTING REPORTS WAS RELEASED AND NOW FOLLOWED BY PCP   • Olecranon bursitis of left elbow    • Pneumonia due to COVID-19 virus     HX OF   • Shortness of breath     WITH EXERTION   • Sleep apnea      Past Surgical History:   Procedure Laterality Date   • CYSTECTOMY      LOWER BACK   • ELBOW OLECRANNON BURSA EXCISION Left 8/3/2022    Procedure: LEFT ELBOW OLECRANNON BURSA EXCISION;  Surgeon: Rajni Yoder MD;  Location: [facility] [place];  Service: Orthopedics;  Laterality: Left;     Family History   Problem Relation Age of Onset   • Malig Hyperthermia Neg Hx        Home Medications:  Prior to Admission medications    Medication Sig Start Date End Date Taking? Authorizing Provider   albuterol (PROVENTIL) (2.5 MG/3ML) 0.083% nebulizer solution USE ONE VIAL VIA NEBULIZER EVERY 4 TO 6 HOURS AS NEEDED 6/15/21   Ernestina Obando MD   albuterol sulfate  (90 Base) MCG/ACT inhaler Inhale 2 puffs Every 4 (Four) Hours As Needed. 6/25/22   Ernestina Obando MD   aspirin 81 MG EC tablet Take 81 mg by mouth Every Night. TAKES  PER  HEART HEALTH. INSTRUCTED PER ANESTHESIA PROTOCOL LAST DOSE 7/27/22    Ernestina Obando MD   buPROPion SR (WELLBUTRIN SR) 150 MG 12 hr tablet Take 150 mg by mouth 2 (Two) Times a Day. 5/26/22   Ernestina Obando MD   cephalexin (KEFLEX) 500 MG capsule Take 1 capsule by mouth Every 6 (Six) Hours. 8/3/22   Rajni Yoder MD   escitalopram (LEXAPRO) 10 MG tablet Take 1 tablet by mouth Daily.  Patient taking differently: Take 10 mg by mouth Every Night. 3/24/22   Desmond Lloyd MD   hydroCHLOROthiazide (HYDRODIURIL) 25 MG tablet Take 1 tablet by mouth Daily.  Patient taking differently: Take 25 mg by mouth Every Night. 3/24/22   Desmond Lloyd MD   HYDROcodone-acetaminophen (NORCO) 7.5-325 MG per tablet Take 1 tablet by mouth Every 4 (Four) Hours As Needed for Moderate Pain  (Pain). 8/3/22   Rajni Yoder MD   multivitamin with minerals tablet tablet Every Night.    Ernestina Obando MD   Potassium 99 MG tablet Take 1 tablet by mouth Every Other Day. TAKES AT NIGHT    Ernestina Obando MD   tamsulosin (FLOMAX) 0.4 MG capsule 24 hr capsule Take 1 capsule by mouth Daily.  Patient taking differently: Take 1 capsule by mouth Every Night. 3/24/22   Desmond Llyod MD   Trelegy Ellipta 200-62.5-25 MCG/INH inhaler Inhale 1 puff Daily. 6/16/21   Ernestina Obando MD        Social History:   Social History     Tobacco Use   • Smoking status: Former   • Smokeless tobacco: Never   • Tobacco comments:     stopped at age 52. smoked this morning.   Vaping Use   • Vaping Use: Never used   Substance Use Topics   • Alcohol use: Never   • Drug use: Never     Recent travel: not applicable    Review of Systems:  Review of Systems   Constitutional: Negative for chills and fever.   HENT: Negative for congestion, rhinorrhea and sore throat.    Eyes: Negative for pain and visual disturbance.   Respiratory: Negative for apnea, cough, chest tightness and shortness of breath.    Cardiovascular:  "Negative for chest pain and palpitations.   Gastrointestinal: Negative for abdominal pain, diarrhea, nausea and vomiting.   Genitourinary: Negative for difficulty urinating and dysuria.   Musculoskeletal: Negative for joint swelling and myalgias.        Positive for ankle pain   Skin: Negative for color change.   Neurological: Negative for seizures and headaches.   Psychiatric/Behavioral: Negative.    All other systems reviewed and are negative.       Physical Exam:  /81   Pulse 82   Temp 98.9 °F (37.2 °C) (Oral)   Resp 16   Ht 167.6 cm (66\")   Wt 78.2 kg (172 lb 6.4 oz)   SpO2 92%   BMI 27.83 kg/m²     Physical Exam  Vitals and nursing note reviewed.   Constitutional:       General: He is not in acute distress.     Appearance: Normal appearance. He is not toxic-appearing.   HENT:      Head: Normocephalic and atraumatic.      Jaw: There is normal jaw occlusion.   Eyes:      General: Lids are normal.      Extraocular Movements: Extraocular movements intact.      Conjunctiva/sclera: Conjunctivae normal.      Pupils: Pupils are equal, round, and reactive to light.   Cardiovascular:      Rate and Rhythm: Normal rate and regular rhythm.      Pulses: Normal pulses.      Heart sounds: Normal heart sounds.   Pulmonary:      Effort: Pulmonary effort is normal. No respiratory distress.      Breath sounds: Normal breath sounds. No decreased breath sounds, wheezing, rhonchi or rales.   Abdominal:      General: Abdomen is flat. There is no distension.      Palpations: Abdomen is soft.      Tenderness: There is no abdominal tenderness. There is no guarding or rebound.   Musculoskeletal:         General: Normal range of motion.      Cervical back: Normal range of motion and neck supple.      Right lower leg: Swelling and tenderness (to palpation lateral malleolus) present. No edema.      Left lower leg: No edema.      Right ankle: Normal range of motion.      Comments: Full ROM right ankle, neurovascular intact "   Skin:     General: Skin is warm and dry.   Neurological:      Mental Status: He is alert and oriented to person, place, and time. Mental status is at baseline.   Psychiatric:         Mood and Affect: Mood normal.                Medications in the Emergency Department:  Medications   HYDROcodone-acetaminophen (NORCO) 5-325 MG per tablet 1 tablet (has no administration in time range)        Labs  Lab Results (last 24 hours)     ** No results found for the last 24 hours. **           Imaging:  XR Ankle 3+ View Right    Result Date: 12/12/2022  PROCEDURE: XR ANKLE 3+ VW RIGHT  COMPARISON: [facility], , ANKLE >OR= 3V RT, 6/15/2018, 10:53.  INDICATIONS: RIGHT ANKLE PAIN POST TRIP AND FALL TODAY.  FINDINGS:  There is a minimally displaced fracture involving the lateral malleolus.  There is overlying soft tissue swelling.  The ankle mortise is intact.  Bone mineralization is normal.  Pes planus.  Plantar calcaneal spurring.        1. Minimally displaced transverse fracture of the lateral malleolus.      TAN LOMBARDI MD       Electronically Signed and Approved By: TAN LOMBARDI MD on 12/12/2022 at 9:56               Procedures:  Procedures    Progress                            Medical Decision Making:  MDM  Number of Diagnoses or Management Options  Closed fracture of right ankle, initial encounter  Diagnosis management comments: In summary this is a 57-year-old male who presents to the emergency department for evaluation of right ankle pain.  He has full range of motion and is neurovascular intact.  X-ray reveals lateral malleolus fracture.  Patient been placed in a walking boot and given oral pain control.  No other injuries identified he is otherwise well-appearing.  Very strict return to ER and follow-up instructions have been provided to the patient.         Final diagnoses:   Closed fracture of right ankle, initial encounter        Disposition:  ED Disposition     ED Disposition   Discharge     Condition   Stable    Comment   --             This medical record created using voice recognition software and a virtual scribe.    Documentation assistance provided by Delaney Jordan acting as scribe for Daniel Hebert MD, MD. Information recorded by the scribe was done at my direction and has been verified and validated by me.         Delaney Jordan  12/12/22 1025       Daniel Hebert MD  12/12/22 1021

## 2023-01-27 ENCOUNTER — TRANSCRIBE ORDERS (OUTPATIENT)
Dept: ADMINISTRATIVE | Facility: HOSPITAL | Age: 58
End: 2023-01-27
Payer: MEDICARE

## 2023-01-27 DIAGNOSIS — R22.41 KNEE MASS, RIGHT: Primary | ICD-10-CM

## 2023-01-27 DIAGNOSIS — M79.661 PAIN OF RIGHT LOWER LEG: ICD-10-CM

## 2023-11-06 ENCOUNTER — TELEPHONE (OUTPATIENT)
Dept: FAMILY MEDICINE CLINIC | Age: 58
End: 2023-11-06
Payer: MEDICARE

## 2023-11-06 DIAGNOSIS — J44.9 COPD, VERY SEVERE: ICD-10-CM

## 2023-11-06 DIAGNOSIS — J96.21 ACUTE ON CHRONIC RESPIRATORY FAILURE WITH HYPOXIA AND HYPERCAPNIA: Primary | ICD-10-CM

## 2023-11-06 DIAGNOSIS — J96.22 ACUTE ON CHRONIC RESPIRATORY FAILURE WITH HYPOXIA AND HYPERCAPNIA: Primary | ICD-10-CM

## 2023-11-06 RX ORDER — LORAZEPAM 2 MG/ML
1 CONCENTRATE ORAL EVERY 4 HOURS PRN
Qty: 30 ML | Refills: 0 | Status: SHIPPED | OUTPATIENT
Start: 2023-11-06 | End: 2023-11-07 | Stop reason: SDUPTHER

## 2023-11-06 RX ORDER — MORPHINE SULFATE 100 MG/5ML
5 SOLUTION ORAL EVERY 4 HOURS PRN
Qty: 30 ML | Refills: 0 | Status: SHIPPED | OUTPATIENT
Start: 2023-11-06 | End: 2023-11-07 | Stop reason: SDUPTHER

## 2023-11-06 NOTE — TELEPHONE ENCOUNTER
Admit to hospice upon discharge from [facility]  Acute on chronic respiratory failure  Severe COPD  Status post resuscitated cardiac arrest September 28, 2023

## 2023-11-07 DIAGNOSIS — J96.22 ACUTE ON CHRONIC RESPIRATORY FAILURE WITH HYPOXIA AND HYPERCAPNIA: ICD-10-CM

## 2023-11-07 DIAGNOSIS — J44.9 COPD, VERY SEVERE: ICD-10-CM

## 2023-11-07 DIAGNOSIS — J96.21 ACUTE ON CHRONIC RESPIRATORY FAILURE WITH HYPOXIA AND HYPERCAPNIA: ICD-10-CM

## 2023-11-07 RX ORDER — LORAZEPAM 2 MG/ML
1 CONCENTRATE ORAL EVERY 4 HOURS PRN
Qty: 30 ML | Refills: 0 | Status: SHIPPED | OUTPATIENT
Start: 2023-11-07

## 2023-11-07 RX ORDER — MORPHINE SULFATE 100 MG/5ML
5 SOLUTION ORAL EVERY 4 HOURS PRN
Qty: 30 ML | Refills: 0 | Status: SHIPPED | OUTPATIENT
Start: 2023-11-07

## 2023-11-24 DIAGNOSIS — J44.9 CHRONIC OBSTRUCTIVE PULMONARY DISEASE, UNSPECIFIED COPD TYPE: Primary | ICD-10-CM

## 2023-11-24 DIAGNOSIS — J96.22 ACUTE ON CHRONIC RESPIRATORY FAILURE WITH HYPOXIA AND HYPERCAPNIA: ICD-10-CM

## 2023-11-24 DIAGNOSIS — J96.21 ACUTE ON CHRONIC RESPIRATORY FAILURE WITH HYPOXIA AND HYPERCAPNIA: ICD-10-CM

## 2023-11-24 DIAGNOSIS — J44.9 COPD, VERY SEVERE: ICD-10-CM

## 2023-11-24 RX ORDER — BUDESONIDE 0.5 MG/2ML
0.5 INHALANT ORAL 2 TIMES DAILY
Qty: 56 ML | Refills: 1 | Status: SHIPPED | OUTPATIENT
Start: 2023-11-24

## 2023-11-24 RX ORDER — FORMOTEROL FUMARATE 20 UG/2ML
20 SOLUTION RESPIRATORY (INHALATION)
Qty: 28 EACH | Refills: 1 | Status: SHIPPED | OUTPATIENT
Start: 2023-11-24

## 2023-12-04 ENCOUNTER — TELEPHONE (OUTPATIENT)
Dept: FAMILY MEDICINE CLINIC | Age: 58
End: 2023-12-04
Payer: MEDICARE

## 2024-01-01 ENCOUNTER — HOSPITAL ENCOUNTER (EMERGENCY)
Facility: HOSPITAL | Age: 59
End: 2024-02-27
Attending: EMERGENCY MEDICINE | Admitting: EMERGENCY MEDICINE
Payer: MEDICARE

## 2024-01-01 ENCOUNTER — APPOINTMENT (OUTPATIENT)
Dept: GENERAL RADIOLOGY | Facility: HOSPITAL | Age: 59
End: 2024-01-01
Payer: MEDICARE

## 2024-01-01 VITALS — OXYGEN SATURATION: 69 % | SYSTOLIC BLOOD PRESSURE: 105 MMHG | TEMPERATURE: 98.6 F | DIASTOLIC BLOOD PRESSURE: 80 MMHG

## 2024-01-01 DIAGNOSIS — J44.1 COPD EXACERBATION: ICD-10-CM

## 2024-01-01 DIAGNOSIS — J96.11 CHRONIC RESPIRATORY FAILURE WITH HYPOXIA AND HYPERCAPNIA: Primary | ICD-10-CM

## 2024-01-01 DIAGNOSIS — J44.9 COPD, VERY SEVERE: ICD-10-CM

## 2024-01-01 DIAGNOSIS — J96.12 CHRONIC RESPIRATORY FAILURE WITH HYPOXIA AND HYPERCAPNIA: Primary | ICD-10-CM

## 2024-01-01 DIAGNOSIS — R09.2 RESPIRATORY ARREST: ICD-10-CM

## 2024-01-01 DIAGNOSIS — J96.21 ACUTE ON CHRONIC RESPIRATORY FAILURE WITH HYPOXIA AND HYPERCAPNIA: ICD-10-CM

## 2024-01-01 DIAGNOSIS — J96.22 ACUTE ON CHRONIC RESPIRATORY FAILURE WITH HYPOXIA AND HYPERCAPNIA: ICD-10-CM

## 2024-01-01 LAB
ALBUMIN SERPL-MCNC: 3.6 G/DL (ref 3.5–5.2)
ALBUMIN/GLOB SERPL: 0.9 G/DL
ALP SERPL-CCNC: 103 U/L (ref 39–117)
ALT SERPL W P-5'-P-CCNC: 15 U/L (ref 1–41)
ANION GAP SERPL CALCULATED.3IONS-SCNC: 2.5 MMOL/L (ref 5–15)
AST SERPL-CCNC: 19 U/L (ref 1–40)
BASOPHILS # BLD AUTO: 0.06 10*3/MM3 (ref 0–0.2)
BASOPHILS NFR BLD AUTO: 0.5 % (ref 0–1.5)
BILIRUB SERPL-MCNC: 0.8 MG/DL (ref 0–1.2)
BUN SERPL-MCNC: 30 MG/DL (ref 6–20)
BUN/CREAT SERPL: 46.2 (ref 7–25)
CALCIUM SPEC-SCNC: 9.5 MG/DL (ref 8.6–10.5)
CHLORIDE SERPL-SCNC: 95 MMOL/L (ref 98–107)
CO2 SERPL-SCNC: 49.5 MMOL/L (ref 22–29)
CREAT SERPL-MCNC: 0.65 MG/DL (ref 0.76–1.27)
DEPRECATED RDW RBC AUTO: 54.8 FL (ref 37–54)
EGFRCR SERPLBLD CKD-EPI 2021: 109.9 ML/MIN/1.73
EOSINOPHIL # BLD AUTO: 0 10*3/MM3 (ref 0–0.4)
EOSINOPHIL NFR BLD AUTO: 0 % (ref 0.3–6.2)
ERYTHROCYTE [DISTWIDTH] IN BLOOD BY AUTOMATED COUNT: 14.3 % (ref 12.3–15.4)
GLOBULIN UR ELPH-MCNC: 4 GM/DL
GLUCOSE SERPL-MCNC: 138 MG/DL (ref 65–99)
HCT VFR BLD AUTO: 51.2 % (ref 37.5–51)
HGB BLD-MCNC: 15 G/DL (ref 13–17.7)
HOLD SPECIMEN: NORMAL
HOLD SPECIMEN: NORMAL
IMM GRANULOCYTES # BLD AUTO: 0.23 10*3/MM3 (ref 0–0.05)
IMM GRANULOCYTES NFR BLD AUTO: 1.8 % (ref 0–0.5)
LYMPHOCYTES # BLD AUTO: 0.73 10*3/MM3 (ref 0.7–3.1)
LYMPHOCYTES NFR BLD AUTO: 5.6 % (ref 19.6–45.3)
MCH RBC QN AUTO: 30.2 PG (ref 26.6–33)
MCHC RBC AUTO-ENTMCNC: 29.3 G/DL (ref 31.5–35.7)
MCV RBC AUTO: 103 FL (ref 79–97)
MONOCYTES # BLD AUTO: 0.87 10*3/MM3 (ref 0.1–0.9)
MONOCYTES NFR BLD AUTO: 6.7 % (ref 5–12)
NEUTROPHILS NFR BLD AUTO: 11.11 10*3/MM3 (ref 1.7–7)
NEUTROPHILS NFR BLD AUTO: 85.4 % (ref 42.7–76)
NRBC BLD AUTO-RTO: 0 /100 WBC (ref 0–0.2)
NT-PROBNP SERPL-MCNC: 2475 PG/ML (ref 0–900)
PLATELET # BLD AUTO: 357 10*3/MM3 (ref 140–450)
PMV BLD AUTO: 9 FL (ref 6–12)
POTASSIUM SERPL-SCNC: 4.9 MMOL/L (ref 3.5–5.2)
PROCALCITONIN SERPL-MCNC: 0.08 NG/ML (ref 0–0.25)
PROT SERPL-MCNC: 7.6 G/DL (ref 6–8.5)
QT INTERVAL: 300 MS
QTC INTERVAL: 381 MS
RBC # BLD AUTO: 4.97 10*6/MM3 (ref 4.14–5.8)
SODIUM SERPL-SCNC: 147 MMOL/L (ref 136–145)
TROPONIN T SERPL HS-MCNC: 49 NG/L
WBC NRBC COR # BLD AUTO: 13 10*3/MM3 (ref 3.4–10.8)
WHOLE BLOOD HOLD COAG: NORMAL
WHOLE BLOOD HOLD SPECIMEN: NORMAL

## 2024-01-01 PROCEDURE — 25010000002 MIDAZOLAM PER 1MG: Performed by: EMERGENCY MEDICINE

## 2024-01-01 PROCEDURE — 99284 EMERGENCY DEPT VISIT MOD MDM: CPT

## 2024-01-01 PROCEDURE — 93005 ELECTROCARDIOGRAM TRACING: CPT

## 2024-01-01 PROCEDURE — 84484 ASSAY OF TROPONIN QUANT: CPT | Performed by: EMERGENCY MEDICINE

## 2024-01-01 PROCEDURE — 93005 ELECTROCARDIOGRAM TRACING: CPT | Performed by: EMERGENCY MEDICINE

## 2024-01-01 PROCEDURE — 84145 PROCALCITONIN (PCT): CPT | Performed by: EMERGENCY MEDICINE

## 2024-01-01 PROCEDURE — 71045 X-RAY EXAM CHEST 1 VIEW: CPT

## 2024-01-01 PROCEDURE — 85025 COMPLETE CBC W/AUTO DIFF WBC: CPT

## 2024-01-01 PROCEDURE — 94799 UNLISTED PULMONARY SVC/PX: CPT

## 2024-01-01 PROCEDURE — 93010 ELECTROCARDIOGRAM REPORT: CPT | Performed by: INTERNAL MEDICINE

## 2024-01-01 PROCEDURE — 94640 AIRWAY INHALATION TREATMENT: CPT

## 2024-01-01 PROCEDURE — 96374 THER/PROPH/DIAG INJ IV PUSH: CPT

## 2024-01-01 PROCEDURE — 83880 ASSAY OF NATRIURETIC PEPTIDE: CPT

## 2024-01-01 PROCEDURE — 80053 COMPREHEN METABOLIC PANEL: CPT | Performed by: EMERGENCY MEDICINE

## 2024-01-01 RX ORDER — MIDAZOLAM HYDROCHLORIDE 2 MG/2ML
1 INJECTION, SOLUTION INTRAMUSCULAR; INTRAVENOUS ONCE
Status: COMPLETED | OUTPATIENT
Start: 2024-01-01 | End: 2024-01-01

## 2024-01-01 RX ORDER — MIDAZOLAM HYDROCHLORIDE 2 MG/2ML
2 INJECTION, SOLUTION INTRAMUSCULAR; INTRAVENOUS ONCE
Status: DISCONTINUED | OUTPATIENT
Start: 2024-01-01 | End: 2024-01-01

## 2024-01-01 RX ORDER — FUROSEMIDE 10 MG/ML
40 INJECTION INTRAMUSCULAR; INTRAVENOUS ONCE
Status: DISCONTINUED | OUTPATIENT
Start: 2024-01-01 | End: 2024-01-01

## 2024-01-01 RX ORDER — BUDESONIDE 0.5 MG/2ML
INHALANT ORAL
Qty: 60 ML | Refills: 0 | OUTPATIENT
Start: 2024-01-01

## 2024-01-01 RX ORDER — METHYLPREDNISOLONE SODIUM SUCCINATE 125 MG/2ML
125 INJECTION, POWDER, LYOPHILIZED, FOR SOLUTION INTRAMUSCULAR; INTRAVENOUS ONCE
Status: DISCONTINUED | OUTPATIENT
Start: 2024-01-01 | End: 2024-01-01

## 2024-01-01 RX ORDER — IPRATROPIUM BROMIDE AND ALBUTEROL SULFATE 2.5; .5 MG/3ML; MG/3ML
3 SOLUTION RESPIRATORY (INHALATION) ONCE
Status: COMPLETED | OUTPATIENT
Start: 2024-01-01 | End: 2024-01-01

## 2024-01-01 RX ORDER — SODIUM CHLORIDE 0.9 % (FLUSH) 0.9 %
10 SYRINGE (ML) INJECTION AS NEEDED
Status: DISCONTINUED | OUTPATIENT
Start: 2024-01-01 | End: 2024-01-01 | Stop reason: HOSPADM

## 2024-01-01 RX ADMIN — IPRATROPIUM BROMIDE AND ALBUTEROL SULFATE 3 ML: .5; 3 SOLUTION RESPIRATORY (INHALATION) at 15:05

## 2024-01-01 RX ADMIN — MIDAZOLAM HYDROCHLORIDE 1 MG: 1 INJECTION, SOLUTION INTRAMUSCULAR; INTRAVENOUS at 15:26

## 2024-01-18 DIAGNOSIS — J96.21 ACUTE ON CHRONIC RESPIRATORY FAILURE WITH HYPOXIA AND HYPERCAPNIA: ICD-10-CM

## 2024-01-18 DIAGNOSIS — J96.22 ACUTE ON CHRONIC RESPIRATORY FAILURE WITH HYPOXIA AND HYPERCAPNIA: ICD-10-CM

## 2024-01-18 DIAGNOSIS — J44.9 COPD, VERY SEVERE: ICD-10-CM

## 2024-01-18 RX ORDER — BUDESONIDE 0.5 MG/2ML
INHALANT ORAL
Qty: 60 ML | Refills: 0 | Status: SHIPPED | OUTPATIENT
Start: 2024-01-18

## 2024-02-27 NOTE — ED PROVIDER NOTES
Time: 2:12 PM EST  Date of encounter:  2/27/2024  Independent Historian/Clinical History and Information was obtained by:   Patient    History is limited by: N/A    Chief Complaint: Shortness of air      History of Present Illness:  Patient is a 58 y.o. year old male with a history of COPD presents to the emergency department for evaluation of shortness of breath.  Patient has been on hospice since October.  He is on 2 L of oxygen at home.  Patient's family states his symptoms have been getting worse since Friday where his oxygen dropped down to 43%.  Per patient's family he has not had any chest pain, fever, or recent illness.  No history available per the patient himself due to respiratory distress.  Wife and patient's father both at bedside.        HPI    Patient Care Team  Primary Care Provider: Mikey Harris MD    Past Medical History:     No Known Allergies  Past Medical History:   Diagnosis Date    Acute exacerbation of chronic obstructive pulmonary disease (COPD)     HX OF REPORTS HAS TO HAVE BEEN MECHANICALLY VENTED X 2    COPD (chronic obstructive pulmonary disease)     USE INHALERS AND NEBULIZER    Hx of chest pain     REPORTS HX OF CP AND WAS SEEN BY DR PEDRAZA IN [place] AND HAD CARDIAC TESTING REPORTS WAS RELEASED AND NOW FOLLOWED BY PCP    Olecranon bursitis of left elbow     Pneumonia due to COVID-19 virus     HX OF    Shortness of breath     WITH EXERTION    Sleep apnea      Past Surgical History:   Procedure Laterality Date    CYSTECTOMY      LOWER BACK    ELBOW OLECRANNON BURSA EXCISION Left 8/3/2022    Procedure: LEFT ELBOW OLECRANNON BURSA EXCISION;  Surgeon: Rajni Yoder MD;  Location: [facility];  Service: Orthopedics;  Laterality: Left;     Family History   Problem Relation Age of Onset    Malig Hyperthermia Neg Hx        Home Medications:  Prior to Admission medications    Medication Sig Start Date End Date Taking? Authorizing Provider   albuterol (PROVENTIL) (2.5 MG/3ML) 0.083%  nebulizer solution USE ONE VIAL VIA NEBULIZER EVERY 4 TO 6 HOURS AS NEEDED 6/15/21   Ernestina Obando MD   albuterol sulfate  (90 Base) MCG/ACT inhaler Inhale 2 puffs Every 4 (Four) Hours As Needed. 6/25/22   Ernestina Obando MD   aspirin 81 MG EC tablet Take 81 mg by mouth Every Night. TAKES  PER HEART HEALTH. INSTRUCTED PER ANESTHESIA PROTOCOL LAST DOSE 7/27/22    Ernestina Obando MD   budesonide (PULMICORT) 0.5 MG/2ML nebulizer solution USE 1 VIAL IN NEBULIZER TWICE DAILY 1/18/24   Lalo Lovelace MD   buPROPion SR (WELLBUTRIN SR) 150 MG 12 hr tablet Take 150 mg by mouth 2 (Two) Times a Day. 5/26/22   Ernestina Obando MD   cephalexin (KEFLEX) 500 MG capsule Take 1 capsule by mouth Every 6 (Six) Hours. 8/3/22   Rajni Yoder MD   escitalopram (LEXAPRO) 10 MG tablet Take 1 tablet by mouth Daily.  Patient taking differently: Take 10 mg by mouth Every Night. 3/24/22   Desmond Lloyd MD   formoterol (PERFOROMIST) 20 MCG/2ML nebulizer solution Take 2 mL by nebulization 2 (Two) Times a Day. 11/24/23   Lalo Lovelace MD   hydroCHLOROthiazide (HYDRODIURIL) 25 MG tablet Take 1 tablet by mouth Daily.  Patient taking differently: Take 25 mg by mouth Every Night. 3/24/22   Desmond Lloyd MD   HYDROcodone-acetaminophen (NORCO) 5-325 MG per tablet Take 1 tablet by mouth Every 6 (Six) Hours As Needed (Pain). 12/12/22   Daniel Hebert MD   HYDROcodone-acetaminophen (NORCO) 7.5-325 MG per tablet Take 1 tablet by mouth Every 4 (Four) Hours As Needed for Moderate Pain  (Pain). 8/3/22   Rajni Yoder MD   LORazepam (LORazepam Intensol) 2 MG/ML concentrated solution Take 0.5 mL by mouth Every 4 (Four) Hours As Needed (anxiety, restlessness, nausea). 11/7/23   Lalo Lovelace MD   morphine 20 MG/ML concentrated solution Take 0.25 mL by mouth Every 4 (Four) Hours As Needed (pain, restlessness, difficulty breathing). 11/7/23   Lalo Lovelace MD   multivitamin with  minerals tablet tablet Every Night.    ProviderErnestina MD   Potassium 99 MG tablet Take 1 tablet by mouth Every Other Day. TAKES AT NIGHT    ProviderErnestina MD   tamsulosin (FLOMAX) 0.4 MG capsule 24 hr capsule Take 1 capsule by mouth Daily.  Patient taking differently: Take 1 capsule by mouth Every Night. 3/24/22   Desmond Lloyd MD        Social History:   Social History     Tobacco Use    Smoking status: Former    Smokeless tobacco: Never    Tobacco comments:     stopped at age 52. smoked this morning.   Vaping Use    Vaping Use: Never used   Substance Use Topics    Alcohol use: Never    Drug use: Never         Review of Systems:  Review of Systems   Unable to perform ROS: Patient unresponsive        Physical Exam:  /80   Pulse (!) 0   Temp 98.6 °F (37 °C) (Oral)   Resp (!) 0   SpO2 (!) 69%     Physical Exam  Vitals and nursing note reviewed.   Constitutional:       General: He is awake.      Appearance: Normal appearance. He is ill-appearing, toxic-appearing and diaphoretic.   HENT:      Head: Normocephalic and atraumatic.      Nose: Nose normal.   Eyes:      Extraocular Movements: Extraocular movements intact.      Pupils: Pupils are equal, round, and reactive to light.   Cardiovascular:      Rate and Rhythm: Regular rhythm. Tachycardia present.      Heart sounds: Normal heart sounds.   Pulmonary:      Effort: Tachypnea, accessory muscle usage and respiratory distress present.      Breath sounds: Examination of the right-upper field reveals decreased breath sounds and wheezing. Examination of the left-upper field reveals decreased breath sounds and wheezing. Decreased breath sounds and wheezing present.   Abdominal:      General: Bowel sounds are normal.      Palpations: Abdomen is soft.      Tenderness: There is no abdominal tenderness. There is no guarding or rebound.      Comments: No rigidity   Musculoskeletal:         General: No tenderness. Normal range of motion.       Cervical back: Normal range of motion and neck supple.   Skin:     General: Skin is warm and dry.      Coloration: Skin is not jaundiced.   Neurological:      General: No focal deficit present.      Mental Status: Mental status is at baseline.      Motor: Weakness present.   Psychiatric:         Behavior: Behavior is agitated.                Procedures:  Procedures      Medical Decision Making:      Comorbidities that affect care:    COPD, anxiety, chronic respiratory failure, REBECA    External Notes reviewed:    Telephone Encounter:    Telephone  2/23/2024  [facility]  Avelino Lowry - 58 y.o. Male; born [birth year]Encounter Summary, generated on Feb. 27, 2024February 27, 2024   Reason for Visit    Reason for Visit -  Reason Onset Date Comments   Hospice 02/23/2024       Encounter Details    Encounter Details  Date Type Department Care Team Description   02/23/2024 Telephone [facility]   [address]   [place] [address] [phone]  Mikey Harris MD   [address]   [address]   [place] [address] [phone] (Work)   [phone] (Fax)  Hospice     Social History  - documented as of this encounter  Social History  Tobacco Use Types Packs/Day Years Used Date   Smoking Tobacco: Every Day Cigarettes 2 44.2 Started: 1980   Smokeless Tobacco: Never             Social History  Comments: stopped less than 1 year, restarted at 1/2 ppd Feb 2023     Social History  Alcohol Use Standard Drinks/Week Comments   Not Currently 0 (1 standard drink = 0.6 oz pure alcohol)       Social History  Housing Stability Vital Sign Answer Date Recorded   In the last 12 months, was there a time when you were not able to pay the mortgage or rent on time? No 03/17/2023   Number of Places Lived in the Last Year Not on file 03/17/2023   In the last 12 months, was there a time when you did not have a steady place to  sleep or slept in a shelter (including now)? No 03/17/2023     Social History  Interpersonal Safety Answer Date Recorded     Not on file 01/20/2024     Not on file 01/20/2024     Not on file 01/20/2024     Not on file 01/20/2024     Social History  Housing Stability Answer Date Recorded     Not on file 01/20/2024     Not on file 01/20/2024     Social History  Food Insecurity Answer Date Recorded     Not on file 01/20/2024     Not on file 01/20/2024     Social History  Employment Answer Date Recorded     Not on file 01/20/2024     Social History  Family and Community Support Answer Date Recorded     Not on file 01/20/2024     Not on file 01/20/2024     Social History  Educational Attainment Answer Date Recorded     Not on file 01/20/2024     Not on file 01/20/2024     Social History  Depression Answer Date Recorded     Not on file 01/20/2024     Social History  Disabilities Answer Date Recorded     Not on file 01/20/2024     Not on file 01/20/2024     Social History  Substance Use Answer Date Recorded     Not on file 01/20/2024     Not on file 01/20/2024     Social History  Sex and Gender Information Value Date Recorded   Sex Assigned at Birth Male 09/09/2022 6:26 PM CDT   Gender Identity Male 09/09/2022 6:26 PM CDT   Sexual Orientation Not on file       Miscellaneous Notes  - documented in this encounter  Telephone Encounter - Cristin Chao - 02/23/2024 1:57 PM EST  Formatting of this note is different from the original.  Last Visit:10/23/24  Next Visit:    Caller Message- Araceli Deluna  Is follow up action needed?No  Explain: SHERWIN    Caller Name::Araceli Bliss  Relation to patient:  Best Call Back Phone [phone]  OK to leave message on voicemail:  Electronically signed by Cristin Chao at 02/23/2024 1:04 PM CST    The following orders were placed and all results were independently analyzed by me:  Orders Placed This Encounter   Procedures    XR Chest 1 View    [place] Draw    Comprehensive Metabolic Panel     BNP    Single High Sensitivity Troponin T    CBC Auto Differential    Procalcitonin    Undress & Gown    Continuous Pulse Oximetry    Vital Signs    Code Status and Medical Interventions:    Oxygen Therapy- Nasal Cannula; Titrate 1-6 LPM Per SpO2; 90 - 95%    ECG 12 Lead ED Triage Standing Order; SOA    Insert Peripheral IV    CBC & Differential    Green Top (Gel)    Lavender Top    Gold Top - SST    Light Blue Top       Medications Given in the Emergency Department:  Medications   sodium chloride 0.9 % flush 10 mL (has no administration in time range)   Midazolam HCl (PF) (VERSED) injection 1 mg (1 mg Intravenous Given 24 1526)   ipratropium-albuterol (DUO-NEB) nebulizer solution 3 mL (3 mL Nebulization Given 24 1505)        ED Course:    ED Course as of 24   1504 Patient with agonal respirations on reevaluation.  Diaphoretic.  Wife and patient's father both at bedside and  as well.  All parties involved in agreement that they do not want intubation or any aggressive measures.  Wife declining BiPAP.  Declining admission as well.  They confirm they want him to be comfort measures only.  I made them aware that he could die in the next few minutes without emergency intervention and they voiced understanding.  Respiratory therapy at bedside. [RP]   1630 O2 sats now in the upper 60s and low 70s.  Family still comfortable with our plan to not intervene.  Requesting more Versed due to perceived respiratory distress/discomfort. [RP]   1644 Called to bedside by nursing staff.  Patient has  at this time.  Time of death 1638. [RP]      ED Course User Index  [RP] Bal Vivar MD       Labs:    Lab Results (last 24 hours)       Procedure Component Value Units Date/Time    CBC & Differential [[id number]]  (Abnormal) Collected: 24 1315    Specimen: Blood Updated: 24 1326    Narrative:      The following orders were created for panel order CBC &  Differential.  Procedure                               Abnormality         Status                     ---------                               -----------         ------                     CBC Auto Differential[[id number]]        Abnormal            Final result                 Please view results for these tests on the individual orders.    Comprehensive Metabolic Panel [[id number]]  (Abnormal) Collected: 02/27/24 1315    Specimen: Blood Updated: 02/27/24 1355     Glucose 138 mg/dL      BUN 30 mg/dL      Creatinine 0.65 mg/dL      Sodium 147 mmol/L      Potassium 4.9 mmol/L      Chloride 95 mmol/L      CO2 49.5 mmol/L      Calcium 9.5 mg/dL      Total Protein 7.6 g/dL      Albumin 3.6 g/dL      ALT (SGPT) 15 U/L      AST (SGOT) 19 U/L      Alkaline Phosphatase 103 U/L      Total Bilirubin 0.8 mg/dL      Globulin 4.0 gm/dL      A/G Ratio 0.9 g/dL      BUN/Creatinine Ratio 46.2     Anion Gap 2.5 mmol/L      eGFR 109.9 mL/min/1.73     Narrative:      GFR Normal >60  Chronic Kidney Disease <60  Kidney Failure <15      BNP [[id number]]  (Abnormal) Collected: 02/27/24 1315    Specimen: Blood Updated: 02/27/24 1346     proBNP 2,475.0 pg/mL     Narrative:      This assay is used as an aid in the diagnosis of individuals suspected of having heart failure. It can be used as an aid in the diagnosis of acute decompensated heart failure (ADHF) in patients presenting with signs and symptoms of ADHF to the emergency department (ED). In addition, NT-proBNP of <300 pg/mL indicates ADHF is not likely.    Age Range Result Interpretation  NT-proBNP Concentration (pg/mL:      <50             Positive            >450                   Gray                 300-450                    Negative             <300    50-75           Positive            >900                  Gray                300-900                  Negative            <300      >75             Positive            >1800                  Gray                300-1800                   Negative            <300    Single High Sensitivity Troponin T [[id number]]  (Abnormal) Collected: 02/27/24 1315    Specimen: Blood Updated: 02/27/24 1348     HS Troponin T 49 ng/L     Narrative:      High Sensitive Troponin T Reference Range:  <14.0 ng/L- Negative Female for AMI  <22.0 ng/L- Negative Male for AMI  >=14 - Abnormal Female indicating possible myocardial injury.  >=22 - Abnormal Male indicating possible myocardial injury.   Clinicians would have to utilize clinical acumen, EKG, Troponin, and serial changes to determine if it is an Acute Myocardial Infarction or myocardial injury due to an underlying chronic condition.         CBC Auto Differential [[id number]]  (Abnormal) Collected: 02/27/24 1315    Specimen: Blood Updated: 02/27/24 1326     WBC 13.00 10*3/mm3      RBC 4.97 10*6/mm3      Hemoglobin 15.0 g/dL      Hematocrit 51.2 %      .0 fL      MCH 30.2 pg      MCHC 29.3 g/dL      RDW 14.3 %      RDW-SD 54.8 fl      MPV 9.0 fL      Platelets 357 10*3/mm3      Neutrophil % 85.4 %      Lymphocyte % 5.6 %      Monocyte % 6.7 %      Eosinophil % 0.0 %      Basophil % 0.5 %      Immature Grans % 1.8 %      Neutrophils, Absolute 11.11 10*3/mm3      Lymphocytes, Absolute 0.73 10*3/mm3      Monocytes, Absolute 0.87 10*3/mm3      Eosinophils, Absolute 0.00 10*3/mm3      Basophils, Absolute 0.06 10*3/mm3      Immature Grans, Absolute 0.23 10*3/mm3      nRBC 0.0 /100 WBC     Procalcitonin [[id number]]  (Normal) Collected: 02/27/24 1315    Specimen: Blood Updated: 02/27/24 1438     Procalcitonin 0.08 ng/mL     Narrative:      As a Marker for Sepsis (Non-Neonates):    1. <0.5 ng/mL represents a low risk of severe sepsis and/or septic shock.  2. >2 ng/mL represents a high risk of severe sepsis and/or septic shock.    As a Marker for Lower Respiratory Tract Infections that require antibiotic therapy:    PCT on Admission    Antibiotic Therapy       6-12 Hrs later    >0.5                Strongly  "Recommended  >0.25 - <0.5        Recommended  0.1 - 0.25          Discouraged              Remeasure/reassess PCT  <0.1                Strongly Discouraged     Remeasure/reassess PCT    As 28 day mortality risk marker: \"Change in Procalcitonin Result\" (>80% or <=80%) if Day 0 (or Day 1) and Day 4 values are available. Refer to http://www.[facility]-pct-calculator.com    Change in PCT <=80%  A decrease of PCT levels below or equal to 80% defines a positive change in PCT test result representing a higher risk for 28-day all-cause mortality of patients diagnosed with severe sepsis for septic shock.    Change in PCT >80%  A decrease of PCT levels of more than 80% defines a negative change in PCT result representing a lower risk for 28-day all-cause mortality of patients diagnosed with severe sepsis or septic shock.    This test is Prognostic not Diagnostic, if elevated correlate with clinical findings before administering antibiotic treatment.                 Imaging:    XR Chest 1 View    Result Date: 2/27/2024  PROCEDURE: XR CHEST 1 VW  COMPARISON: [facility], CT, CT CHEST LOW DOSE CANCER SCREENING WO, 3/16/2022, 11:21.  INDICATIONS: SOA Triage Protocol  FINDINGS:  There is severe pulmonary emphysematous change.  Apical scarring and bullous change again noted.  There are patchy airspace opacities in the bases bilaterally, right greater than left suggesting pneumonia.  Probable small amount of right pleural fluid.  There is prominence of the althea bilaterally suggesting underlying pulmonary hypertension.  Calcified granulomatous changes are noted.  Heart size is within normal limits.  No pneumothorax identified.         1. Patchy bibasilar airspace opacities, right greater than left suggesting pneumonia.  Probable small right effusion.  2. Severe pulmonary emphysematous change.  3. Prominence of the proximal pulmonary arteries suggesting underlying pulmonary hypertension.        SIDRA FERNANDEZ MD       " Electronically Signed and Approved By: SIDRA FERNANDEZ MD on 2024 at 12:31                Differential Diagnosis and Discussion:    Dyspnea: Differential diagnosis includes but is not limited to metabolic acidosis, neurological disorders, psychogenic, asthma, pneumothorax, upper airway obstruction, COPD, pneumonia, noncardiogenic pulmonary edema, interstitial lung disease, anemia, congestive heart failure, and pulmonary embolism    All labs were reviewed and interpreted by me.  All X-rays impressions were independently interpreted by me.  EKG was interpreted by me.    MDM     Amount and/or Complexity of Data Reviewed  Decide to obtain previous medical records or to obtain history from someone other than the patient: yes                 Patient Care Considerations:    There were several indications for sepsis treatment, intubation, BiPAP, antibiotic treatments that all would be medically appropriate if the patient/family requested this.  Multiple family members at bedside and the decision was made to make the patient comfort measures only with no treatment of any kind aside from anxiolysis.      Consultants/Shared Management Plan:    None    Social Determinants of Health:    Patient has presented with family members who are responsible, reliable and will ensure follow up care.      Disposition and Care Coordination:    : Time of death 1638.  There were no attempts at resuscitation based on patient/family request.        Final diagnoses:   Chronic respiratory failure with hypoxia and hypercapnia   COPD exacerbation   Respiratory arrest        ED Disposition       ED Disposition       Condition   --    Comment   --               This medical record created using voice recognition software.             Bal Vivar MD  [phone]

## 2024-02-27 NOTE — ED NOTES
Pt family requesting autopsy. Pt family speaking with  for guidance on what to do. Pt family refused NATALIE and organ donation. Pt family requests [facility] in [place] when body is ready for  home.

## (undated) DEVICE — VAGINAL PREP TRAY: Brand: MEDLINE INDUSTRIES, INC.

## (undated) DEVICE — UNDERCAST PADDING: Brand: DEROYAL

## (undated) DEVICE — GLV SURG BIOGEL LTX PF 8 1/2

## (undated) DEVICE — SUT ETHLN 4/0 FS2 18IN 662H

## (undated) DEVICE — GAUZE,SPONGE,4"X4",16PLY,STRL,LF,10/TRAY: Brand: MEDLINE

## (undated) DEVICE — BNDG ESMARK 4IN 12FT LF STRL BLU

## (undated) DEVICE — GLV SURG SENSICARE SLT PF LF 8.5 STRL

## (undated) DEVICE — GOWN,REINFORCE,POLY,SIRUS,BREATH SLV,XLG: Brand: MEDLINE

## (undated) DEVICE — DRSNG GZ PETROLTM XEROFORM CURAD 1X8IN STRL

## (undated) DEVICE — EXTREMITY-LF: Brand: MEDLINE INDUSTRIES, INC.

## (undated) DEVICE — SUT MNCRYL 2/0 SH 27IN Y417H

## (undated) DEVICE — GLV SURG SENSICARE PI PF LF 7 GRN STRL

## (undated) DEVICE — INTENDED FOR TISSUE SEPARATION, AND OTHER PROCEDURES THAT REQUIRE A SHARP SURGICAL BLADE TO PUNCTURE OR CUT.: Brand: BARD-PARKER ® CARBON RIB-BACK BLADES

## (undated) DEVICE — 3M™ STERI-STRIP™ REINFORCED ADHESIVE SKIN CLOSURES, R1547, 1/2 IN X 4 IN (12 MM X 100 MM), 6 STRIPS/ENVELOPE: Brand: 3M™ STERI-STRIP™

## (undated) DEVICE — SOL IRR NACL 0.9PCT BT 1000ML

## (undated) DEVICE — GLV SURG SENSICARE SLT PF LF 7 STRL

## (undated) DEVICE — 1010 S-DRAPE TOWEL DRAPE 10/BX: Brand: STERI-DRAPE™

## (undated) DEVICE — DISPOSABLE TOURNIQUET CUFF SINGLE BLADDER, SINGLE PORT AND QUICK CONNECT CONNECTOR: Brand: COLOR CUFF

## (undated) DEVICE — PENCL E/S SMOKEEVAC W/TELESCP CANN

## (undated) DEVICE — SUT VIC 2/0 CT1 36IN

## (undated) DEVICE — BNDG ELAS DELUXE REINF 10CM 5MTR STRL